# Patient Record
Sex: FEMALE | Race: WHITE | URBAN - METROPOLITAN AREA
[De-identification: names, ages, dates, MRNs, and addresses within clinical notes are randomized per-mention and may not be internally consistent; named-entity substitution may affect disease eponyms.]

---

## 2022-11-04 ENCOUNTER — HOSPITAL ENCOUNTER (OUTPATIENT)
Dept: RADIOLOGY | Facility: MEDICAL CENTER | Age: 87
End: 2022-11-04

## 2023-01-01 ENCOUNTER — APPOINTMENT (OUTPATIENT)
Dept: RADIOLOGY | Facility: MEDICAL CENTER | Age: 88
DRG: 082 | End: 2023-01-01
Attending: EMERGENCY MEDICINE
Payer: MEDICARE

## 2023-01-01 ENCOUNTER — APPOINTMENT (OUTPATIENT)
Dept: RADIOLOGY | Facility: MEDICAL CENTER | Age: 88
DRG: 082 | End: 2023-01-01
Payer: MEDICARE

## 2023-01-01 ENCOUNTER — HOSPITAL ENCOUNTER (OUTPATIENT)
Dept: RADIOLOGY | Facility: MEDICAL CENTER | Age: 88
End: 2023-11-28

## 2023-01-01 ENCOUNTER — HOSPITAL ENCOUNTER (INPATIENT)
Facility: MEDICAL CENTER | Age: 88
LOS: 3 days | DRG: 082 | End: 2023-12-01
Attending: EMERGENCY MEDICINE | Admitting: SURGERY
Payer: MEDICARE

## 2023-01-01 ENCOUNTER — APPOINTMENT (OUTPATIENT)
Dept: RADIOLOGY | Facility: MEDICAL CENTER | Age: 88
DRG: 082 | End: 2023-01-01
Attending: SURGERY
Payer: MEDICARE

## 2023-01-01 VITALS
WEIGHT: 103.62 LBS | OXYGEN SATURATION: 91 % | RESPIRATION RATE: 17 BRPM | SYSTOLIC BLOOD PRESSURE: 127 MMHG | HEART RATE: 89 BPM | DIASTOLIC BLOOD PRESSURE: 78 MMHG | HEIGHT: 58 IN | BODY MASS INDEX: 21.75 KG/M2 | TEMPERATURE: 97.9 F

## 2023-01-01 DIAGNOSIS — T14.90XA TRAUMA: ICD-10-CM

## 2023-01-01 DIAGNOSIS — F02.C11 SEVERE ALZHEIMER'S DEMENTIA WITH AGITATION, UNSPECIFIED TIMING OF DEMENTIA ONSET (HCC): ICD-10-CM

## 2023-01-01 DIAGNOSIS — G30.9 SEVERE ALZHEIMER'S DEMENTIA WITH AGITATION, UNSPECIFIED TIMING OF DEMENTIA ONSET (HCC): ICD-10-CM

## 2023-01-01 LAB
ABO + RH BLD: NORMAL
ABO GROUP BLD: NORMAL
ALBUMIN SERPL BCP-MCNC: 3.6 G/DL (ref 3.2–4.9)
ALBUMIN/GLOB SERPL: 1.2 G/DL
ALP SERPL-CCNC: 83 U/L (ref 30–99)
ALT SERPL-CCNC: 25 U/L (ref 2–50)
ANION GAP SERPL CALC-SCNC: 11 MMOL/L (ref 7–16)
ANION GAP SERPL CALC-SCNC: 11 MMOL/L (ref 7–16)
ANION GAP SERPL CALC-SCNC: 14 MMOL/L (ref 7–16)
ANION GAP SERPL CALC-SCNC: 17 MMOL/L (ref 7–16)
APPEARANCE UR: CLEAR
APTT PPP: 33.6 SEC (ref 24.7–36)
AST SERPL-CCNC: 44 U/L (ref 12–45)
BACTERIA #/AREA URNS HPF: NEGATIVE /HPF
BARCODED ABORH UBTYP: 5100
BARCODED PRD CODE UBPRD: NORMAL
BARCODED UNIT NUM UBUNT: NORMAL
BASOPHILS # BLD AUTO: 0 % (ref 0–1.8)
BASOPHILS # BLD AUTO: 1.6 % (ref 0–1.8)
BASOPHILS # BLD: 0 K/UL (ref 0–0.12)
BASOPHILS # BLD: 0.05 K/UL (ref 0–0.12)
BILIRUB SERPL-MCNC: 0.7 MG/DL (ref 0.1–1.5)
BILIRUB UR QL STRIP.AUTO: NEGATIVE
BLD GP AB SCN SERPL QL: NORMAL
BUN SERPL-MCNC: 42 MG/DL (ref 8–22)
BUN SERPL-MCNC: 49 MG/DL (ref 8–22)
BUN SERPL-MCNC: 49 MG/DL (ref 8–22)
BUN SERPL-MCNC: 51 MG/DL (ref 8–22)
BURR CELLS BLD QL SMEAR: NORMAL
C DIFF DNA SPEC QL NAA+PROBE: NEGATIVE
C DIFF TOX GENS STL QL NAA+PROBE: NEGATIVE
CALCIUM ALBUM COR SERPL-MCNC: 9.8 MG/DL (ref 8.5–10.5)
CALCIUM SERPL-MCNC: 7.6 MG/DL (ref 8.5–10.5)
CALCIUM SERPL-MCNC: 8.2 MG/DL (ref 8.5–10.5)
CALCIUM SERPL-MCNC: 8.6 MG/DL (ref 8.5–10.5)
CALCIUM SERPL-MCNC: 9.5 MG/DL (ref 8.5–10.5)
CFT BLD TEG: 4.4 MIN (ref 4.6–9.1)
CFT BLD TEG: 4.9 MIN (ref 4.6–9.1)
CFT P HPASE BLD TEG: 4.6 MIN (ref 4.3–8.3)
CFT P HPASE BLD TEG: 5 MIN (ref 4.3–8.3)
CHLORIDE SERPL-SCNC: 101 MMOL/L (ref 96–112)
CHLORIDE SERPL-SCNC: 106 MMOL/L (ref 96–112)
CHLORIDE SERPL-SCNC: 112 MMOL/L (ref 96–112)
CHLORIDE SERPL-SCNC: 99 MMOL/L (ref 96–112)
CK SERPL-CCNC: 753 U/L (ref 0–154)
CLOT ANGLE BLD TEG: 72.6 DEGREES (ref 63–78)
CLOT ANGLE BLD TEG: 75.7 DEGREES (ref 63–78)
CLOT LYSIS 30M P MA LENFR BLD TEG: 0 % (ref 0–2.6)
CLOT LYSIS 30M P MA LENFR BLD TEG: 0 % (ref 0–2.6)
CO2 SERPL-SCNC: 18 MMOL/L (ref 20–33)
CO2 SERPL-SCNC: 19 MMOL/L (ref 20–33)
COLOR UR: YELLOW
COMPONENT P 8504P: NORMAL
CREAT SERPL-MCNC: 1.25 MG/DL (ref 0.5–1.4)
CREAT SERPL-MCNC: 1.74 MG/DL (ref 0.5–1.4)
CREAT SERPL-MCNC: 1.82 MG/DL (ref 0.5–1.4)
CREAT SERPL-MCNC: 2.07 MG/DL (ref 0.5–1.4)
CRP SERPL HS-MCNC: 26.92 MG/DL (ref 0–0.75)
CT.EXTRINSIC BLD ROTEM: 1 MIN (ref 0.8–2.1)
CT.EXTRINSIC BLD ROTEM: 1.3 MIN (ref 0.8–2.1)
EKG IMPRESSION: NORMAL
EOSINOPHIL # BLD AUTO: 0 K/UL (ref 0–0.51)
EOSINOPHIL # BLD AUTO: 0 K/UL (ref 0–0.51)
EOSINOPHIL NFR BLD: 0 % (ref 0–6.9)
EOSINOPHIL NFR BLD: 0 % (ref 0–6.9)
EPI CELLS #/AREA URNS HPF: NEGATIVE /HPF
ERYTHROCYTE [DISTWIDTH] IN BLOOD BY AUTOMATED COUNT: 51.4 FL (ref 35.9–50)
ERYTHROCYTE [DISTWIDTH] IN BLOOD BY AUTOMATED COUNT: 52.8 FL (ref 35.9–50)
ERYTHROCYTE [DISTWIDTH] IN BLOOD BY AUTOMATED COUNT: 53.6 FL (ref 35.9–50)
ETHANOL BLD-MCNC: <10.1 MG/DL
GFR SERPLBLD CREATININE-BSD FMLA CKD-EPI: 22 ML/MIN/1.73 M 2
GFR SERPLBLD CREATININE-BSD FMLA CKD-EPI: 26 ML/MIN/1.73 M 2
GFR SERPLBLD CREATININE-BSD FMLA CKD-EPI: 27 ML/MIN/1.73 M 2
GFR SERPLBLD CREATININE-BSD FMLA CKD-EPI: 41 ML/MIN/1.73 M 2
GLOBULIN SER CALC-MCNC: 2.9 G/DL (ref 1.9–3.5)
GLUCOSE BLD STRIP.AUTO-MCNC: 102 MG/DL (ref 65–99)
GLUCOSE BLD STRIP.AUTO-MCNC: 66 MG/DL (ref 65–99)
GLUCOSE BLD STRIP.AUTO-MCNC: 68 MG/DL (ref 65–99)
GLUCOSE BLD STRIP.AUTO-MCNC: 74 MG/DL (ref 65–99)
GLUCOSE BLD STRIP.AUTO-MCNC: 83 MG/DL (ref 65–99)
GLUCOSE BLD STRIP.AUTO-MCNC: 89 MG/DL (ref 65–99)
GLUCOSE BLD STRIP.AUTO-MCNC: 92 MG/DL (ref 65–99)
GLUCOSE SERPL-MCNC: 107 MG/DL (ref 65–99)
GLUCOSE SERPL-MCNC: 72 MG/DL (ref 65–99)
GLUCOSE SERPL-MCNC: 79 MG/DL (ref 65–99)
GLUCOSE SERPL-MCNC: 98 MG/DL (ref 65–99)
GLUCOSE UR STRIP.AUTO-MCNC: NEGATIVE MG/DL
HCT VFR BLD AUTO: 24.4 % (ref 37–47)
HCT VFR BLD AUTO: 26.4 % (ref 37–47)
HCT VFR BLD AUTO: 31.2 % (ref 37–47)
HGB BLD-MCNC: 10 G/DL (ref 12–16)
HGB BLD-MCNC: 8.3 G/DL (ref 12–16)
HGB BLD-MCNC: 8.7 G/DL (ref 12–16)
HYALINE CASTS #/AREA URNS LPF: NORMAL /LPF
INR PPP: 1.25 (ref 0.87–1.13)
KETONES UR STRIP.AUTO-MCNC: NEGATIVE MG/DL
LEUKOCYTE ESTERASE UR QL STRIP.AUTO: NEGATIVE
LYMPHOCYTES # BLD AUTO: 0.88 K/UL (ref 1–4.8)
LYMPHOCYTES # BLD AUTO: 1.41 K/UL (ref 1–4.8)
LYMPHOCYTES NFR BLD: 28.5 % (ref 22–41)
LYMPHOCYTES NFR BLD: 9.8 % (ref 22–41)
MACROCYTES BLD QL SMEAR: ABNORMAL
MAGNESIUM SERPL-MCNC: 2.5 MG/DL (ref 1.5–2.5)
MANUAL DIFF BLD: ABNORMAL
MANUAL DIFF BLD: NORMAL
MCF BLD TEG: 60.3 MM (ref 52–69)
MCF BLD TEG: 65.6 MM (ref 52–69)
MCF.PLATELET INHIB BLD ROTEM: 17.4 MM (ref 15–32)
MCF.PLATELET INHIB BLD ROTEM: 21.4 MM (ref 15–32)
MCH RBC QN AUTO: 28.3 PG (ref 27–33)
MCH RBC QN AUTO: 29.1 PG (ref 27–33)
MCH RBC QN AUTO: 29.1 PG (ref 27–33)
MCHC RBC AUTO-ENTMCNC: 32.1 G/DL (ref 32.2–35.5)
MCHC RBC AUTO-ENTMCNC: 33 G/DL (ref 32.2–35.5)
MCHC RBC AUTO-ENTMCNC: 34 G/DL (ref 32.2–35.5)
MCV RBC AUTO: 85.6 FL (ref 81.4–97.8)
MCV RBC AUTO: 86 FL (ref 81.4–97.8)
MCV RBC AUTO: 90.7 FL (ref 81.4–97.8)
METAMYELOCYTES NFR BLD MANUAL: 1.7 %
MICRO URNS: ABNORMAL
MONOCYTES # BLD AUTO: 0.71 K/UL (ref 0–0.85)
MONOCYTES # BLD AUTO: 0.81 K/UL (ref 0–0.85)
MONOCYTES NFR BLD AUTO: 26 % (ref 0–13.4)
MONOCYTES NFR BLD AUTO: 4.9 % (ref 0–13.4)
MORPHOLOGY BLD-IMP: NORMAL
MORPHOLOGY BLD-IMP: NORMAL
NEUTROPHILS # BLD AUTO: 1.36 K/UL (ref 1.82–7.42)
NEUTROPHILS # BLD AUTO: 12.04 K/UL (ref 1.82–7.42)
NEUTROPHILS NFR BLD: 40.6 % (ref 44–72)
NEUTROPHILS NFR BLD: 72.1 % (ref 44–72)
NEUTS BAND NFR BLD MANUAL: 11.5 % (ref 0–10)
NEUTS BAND NFR BLD MANUAL: 3.3 % (ref 0–10)
NITRITE UR QL STRIP.AUTO: NEGATIVE
NRBC # BLD AUTO: 0 K/UL
NRBC # BLD AUTO: 0 K/UL
NRBC BLD-RTO: 0 /100 WBC (ref 0–0.2)
NRBC BLD-RTO: 0 /100 WBC (ref 0–0.2)
PA AA BLD-ACNC: 76.8 % (ref 0–11)
PA AA BLD-ACNC: 93.4 % (ref 0–11)
PA ADP BLD-ACNC: 90.5 % (ref 0–17)
PA ADP BLD-ACNC: 98.5 % (ref 0–17)
PH UR STRIP.AUTO: 5.5 [PH] (ref 5–8)
PLATELET # BLD AUTO: 233 K/UL (ref 164–446)
PLATELET # BLD AUTO: 237 K/UL (ref 164–446)
PLATELET # BLD AUTO: 263 K/UL (ref 164–446)
PLATELET BLD QL SMEAR: NORMAL
PLATELET BLD QL SMEAR: NORMAL
PMV BLD AUTO: 9.6 FL (ref 9–12.9)
PMV BLD AUTO: 9.7 FL (ref 9–12.9)
PMV BLD AUTO: 9.8 FL (ref 9–12.9)
POIKILOCYTOSIS BLD QL SMEAR: NORMAL
POTASSIUM SERPL-SCNC: 3.8 MMOL/L (ref 3.6–5.5)
POTASSIUM SERPL-SCNC: 4.4 MMOL/L (ref 3.6–5.5)
POTASSIUM SERPL-SCNC: 4.7 MMOL/L (ref 3.6–5.5)
POTASSIUM SERPL-SCNC: 4.8 MMOL/L (ref 3.6–5.5)
PROCALCITONIN SERPL-MCNC: 20.7 NG/ML
PRODUCT TYPE UPROD: NORMAL
PROT SERPL-MCNC: 6.5 G/DL (ref 6–8.2)
PROT UR QL STRIP: NEGATIVE MG/DL
PROTHROMBIN TIME: 15.8 SEC (ref 12–14.6)
RBC # BLD AUTO: 2.85 M/UL (ref 4.2–5.4)
RBC # BLD AUTO: 3.07 M/UL (ref 4.2–5.4)
RBC # BLD AUTO: 3.44 M/UL (ref 4.2–5.4)
RBC # URNS HPF: NORMAL /HPF
RBC BLD AUTO: NORMAL
RBC BLD AUTO: PRESENT
RBC UR QL AUTO: ABNORMAL
RH BLD: NORMAL
SODIUM SERPL-SCNC: 133 MMOL/L (ref 135–145)
SODIUM SERPL-SCNC: 134 MMOL/L (ref 135–145)
SODIUM SERPL-SCNC: 136 MMOL/L (ref 135–145)
SODIUM SERPL-SCNC: 141 MMOL/L (ref 135–145)
SP GR UR STRIP.AUTO: 1.02
TEG ALGORITHM TGALG: ABNORMAL
TEG ALGORITHM TGALG: ABNORMAL
TOXIC GRANULES BLD QL SMEAR: NORMAL
UNIT STATUS USTAT: NORMAL
UROBILINOGEN UR STRIP.AUTO-MCNC: 0.2 MG/DL
WBC # BLD AUTO: 14.4 K/UL (ref 4.8–10.8)
WBC # BLD AUTO: 18.7 K/UL (ref 4.8–10.8)
WBC # BLD AUTO: 3.1 K/UL (ref 4.8–10.8)
WBC #/AREA URNS HPF: NORMAL /HPF
WBC TOXIC VACUOLES BLD QL SMEAR: NORMAL

## 2023-01-01 PROCEDURE — 81001 URINALYSIS AUTO W/SCOPE: CPT

## 2023-01-01 PROCEDURE — A9270 NON-COVERED ITEM OR SERVICE: HCPCS | Performed by: SURGERY

## 2023-01-01 PROCEDURE — 700105 HCHG RX REV CODE 258: Performed by: INTERNAL MEDICINE

## 2023-01-01 PROCEDURE — 700111 HCHG RX REV CODE 636 W/ 250 OVERRIDE (IP): Mod: JZ | Performed by: SURGERY

## 2023-01-01 PROCEDURE — A9270 NON-COVERED ITEM OR SERVICE: HCPCS | Performed by: INTERNAL MEDICINE

## 2023-01-01 PROCEDURE — 80048 BASIC METABOLIC PNL TOTAL CA: CPT

## 2023-01-01 PROCEDURE — 73130 X-RAY EXAM OF HAND: CPT | Mod: LT

## 2023-01-01 PROCEDURE — 82962 GLUCOSE BLOOD TEST: CPT

## 2023-01-01 PROCEDURE — 99233 SBSQ HOSP IP/OBS HIGH 50: CPT | Mod: 25 | Performed by: INTERNAL MEDICINE

## 2023-01-01 PROCEDURE — 700101 HCHG RX REV CODE 250: Performed by: NURSE PRACTITIONER

## 2023-01-01 PROCEDURE — 700105 HCHG RX REV CODE 258

## 2023-01-01 PROCEDURE — 99223 1ST HOSP IP/OBS HIGH 75: CPT | Mod: 25 | Performed by: INTERNAL MEDICINE

## 2023-01-01 PROCEDURE — 700101 HCHG RX REV CODE 250: Performed by: SURGERY

## 2023-01-01 PROCEDURE — 87493 C DIFF AMPLIFIED PROBE: CPT

## 2023-01-01 PROCEDURE — 93010 ELECTROCARDIOGRAM REPORT: CPT | Performed by: INTERNAL MEDICINE

## 2023-01-01 PROCEDURE — 85347 COAGULATION TIME ACTIVATED: CPT

## 2023-01-01 PROCEDURE — 700111 HCHG RX REV CODE 636 W/ 250 OVERRIDE (IP): Performed by: SURGERY

## 2023-01-01 PROCEDURE — 770022 HCHG ROOM/CARE - ICU (200)

## 2023-01-01 PROCEDURE — 97163 PT EVAL HIGH COMPLEX 45 MIN: CPT

## 2023-01-01 PROCEDURE — 85730 THROMBOPLASTIN TIME PARTIAL: CPT

## 2023-01-01 PROCEDURE — 86901 BLOOD TYPING SEROLOGIC RH(D): CPT

## 2023-01-01 PROCEDURE — 700105 HCHG RX REV CODE 258: Performed by: NURSE PRACTITIONER

## 2023-01-01 PROCEDURE — 700117 HCHG RX CONTRAST REV CODE 255

## 2023-01-01 PROCEDURE — 85007 BL SMEAR W/DIFF WBC COUNT: CPT

## 2023-01-01 PROCEDURE — 99232 SBSQ HOSP IP/OBS MODERATE 35: CPT | Performed by: NURSE PRACTITIONER

## 2023-01-01 PROCEDURE — 36430 TRANSFUSION BLD/BLD COMPNT: CPT

## 2023-01-01 PROCEDURE — 85384 FIBRINOGEN ACTIVITY: CPT

## 2023-01-01 PROCEDURE — 93005 ELECTROCARDIOGRAM TRACING: CPT | Performed by: SURGERY

## 2023-01-01 PROCEDURE — 80053 COMPREHEN METABOLIC PANEL: CPT

## 2023-01-01 PROCEDURE — 85576 BLOOD PLATELET AGGREGATION: CPT | Mod: 91

## 2023-01-01 PROCEDURE — 700111 HCHG RX REV CODE 636 W/ 250 OVERRIDE (IP): Performed by: INTERNAL MEDICINE

## 2023-01-01 PROCEDURE — 700102 HCHG RX REV CODE 250 W/ 637 OVERRIDE(OP): Performed by: INTERNAL MEDICINE

## 2023-01-01 PROCEDURE — A9270 NON-COVERED ITEM OR SERVICE: HCPCS | Performed by: NURSE PRACTITIONER

## 2023-01-01 PROCEDURE — 306565 RIGID MIT RESTRAINT(PAIR): Performed by: SURGERY

## 2023-01-01 PROCEDURE — 8E0ZXY6 ISOLATION: ICD-10-PCS | Performed by: INTERNAL MEDICINE

## 2023-01-01 PROCEDURE — 93005 ELECTROCARDIOGRAM TRACING: CPT | Performed by: NURSE PRACTITIONER

## 2023-01-01 PROCEDURE — 99291 CRITICAL CARE FIRST HOUR: CPT

## 2023-01-01 PROCEDURE — 700102 HCHG RX REV CODE 250 W/ 637 OVERRIDE(OP): Performed by: SURGERY

## 2023-01-01 PROCEDURE — 97535 SELF CARE MNGMENT TRAINING: CPT

## 2023-01-01 PROCEDURE — 82077 ASSAY SPEC XCP UR&BREATH IA: CPT

## 2023-01-01 PROCEDURE — 82550 ASSAY OF CK (CPK): CPT

## 2023-01-01 PROCEDURE — 99497 ADVNCD CARE PLAN 30 MIN: CPT | Performed by: INTERNAL MEDICINE

## 2023-01-01 PROCEDURE — 99232 SBSQ HOSP IP/OBS MODERATE 35: CPT | Performed by: INTERNAL MEDICINE

## 2023-01-01 PROCEDURE — 700102 HCHG RX REV CODE 250 W/ 637 OVERRIDE(OP): Performed by: NURSE PRACTITIONER

## 2023-01-01 PROCEDURE — 30233R1 TRANSFUSION OF NONAUTOLOGOUS PLATELETS INTO PERIPHERAL VEIN, PERCUTANEOUS APPROACH: ICD-10-PCS | Performed by: EMERGENCY MEDICINE

## 2023-01-01 PROCEDURE — 99223 1ST HOSP IP/OBS HIGH 75: CPT | Mod: AI | Performed by: SURGERY

## 2023-01-01 PROCEDURE — 87040 BLOOD CULTURE FOR BACTERIA: CPT | Mod: 91

## 2023-01-01 PROCEDURE — 93010 ELECTROCARDIOGRAM REPORT: CPT | Mod: 76 | Performed by: INTERNAL MEDICINE

## 2023-01-01 PROCEDURE — P9034 PLATELETS, PHERESIS: HCPCS

## 2023-01-01 PROCEDURE — 72170 X-RAY EXAM OF PELVIS: CPT

## 2023-01-01 PROCEDURE — 85027 COMPLETE CBC AUTOMATED: CPT

## 2023-01-01 PROCEDURE — 86140 C-REACTIVE PROTEIN: CPT

## 2023-01-01 PROCEDURE — 36415 COLL VENOUS BLD VENIPUNCTURE: CPT

## 2023-01-01 PROCEDURE — 770001 HCHG ROOM/CARE - MED/SURG/GYN PRIV*

## 2023-01-01 PROCEDURE — 84145 PROCALCITONIN (PCT): CPT

## 2023-01-01 PROCEDURE — 83735 ASSAY OF MAGNESIUM: CPT

## 2023-01-01 PROCEDURE — 700111 HCHG RX REV CODE 636 W/ 250 OVERRIDE (IP): Mod: JZ | Performed by: INTERNAL MEDICINE

## 2023-01-01 PROCEDURE — 86850 RBC ANTIBODY SCREEN: CPT

## 2023-01-01 PROCEDURE — 71045 X-RAY EXAM CHEST 1 VIEW: CPT

## 2023-01-01 PROCEDURE — 86900 BLOOD TYPING SEROLOGIC ABO: CPT

## 2023-01-01 PROCEDURE — 85610 PROTHROMBIN TIME: CPT

## 2023-01-01 PROCEDURE — 70450 CT HEAD/BRAIN W/O DYE: CPT

## 2023-01-01 PROCEDURE — 74177 CT ABD & PELVIS W/CONTRAST: CPT

## 2023-01-01 PROCEDURE — 97167 OT EVAL HIGH COMPLEX 60 MIN: CPT

## 2023-01-01 PROCEDURE — 99231 SBSQ HOSP IP/OBS SF/LOW 25: CPT | Performed by: NURSE PRACTITIONER

## 2023-01-01 PROCEDURE — 82962 GLUCOSE BLOOD TEST: CPT | Mod: 91

## 2023-01-01 PROCEDURE — 99232 SBSQ HOSP IP/OBS MODERATE 35: CPT

## 2023-01-01 PROCEDURE — G0390 TRAUMA RESPONS W/HOSP CRITI: HCPCS

## 2023-01-01 PROCEDURE — 92610 EVALUATE SWALLOWING FUNCTION: CPT

## 2023-01-01 RX ORDER — AMOXICILLIN 250 MG
1 CAPSULE ORAL NIGHTLY
Status: DISCONTINUED | OUTPATIENT
Start: 2023-01-01 | End: 2023-01-01

## 2023-01-01 RX ORDER — TRAMADOL HYDROCHLORIDE 50 MG/1
50 TABLET ORAL EVERY 6 HOURS PRN
COMMUNITY
Start: 2023-01-01

## 2023-01-01 RX ORDER — BUPROPION HYDROCHLORIDE 150 MG/1
150 TABLET, EXTENDED RELEASE ORAL EVERY MORNING
Status: DISCONTINUED | OUTPATIENT
Start: 2023-01-01 | End: 2023-01-01

## 2023-01-01 RX ORDER — METOPROLOL SUCCINATE 50 MG/1
50 TABLET, EXTENDED RELEASE ORAL DAILY
COMMUNITY
Start: 2023-01-01

## 2023-01-01 RX ORDER — SODIUM CHLORIDE 9 MG/ML
INJECTION, SOLUTION INTRAVENOUS CONTINUOUS
Status: DISCONTINUED | OUTPATIENT
Start: 2023-01-01 | End: 2023-01-01

## 2023-01-01 RX ORDER — HALOPERIDOL 5 MG/ML
1 INJECTION INTRAMUSCULAR EVERY 6 HOURS PRN
Status: DISCONTINUED | OUTPATIENT
Start: 2023-01-01 | End: 2023-01-01 | Stop reason: HOSPADM

## 2023-01-01 RX ORDER — ATROPINE SULFATE 10 MG/ML
2 SOLUTION/ DROPS OPHTHALMIC EVERY 4 HOURS PRN
Status: DISCONTINUED | OUTPATIENT
Start: 2023-01-01 | End: 2023-01-01 | Stop reason: HOSPADM

## 2023-01-01 RX ORDER — ATORVASTATIN CALCIUM 40 MG/1
40 TABLET, FILM COATED ORAL DAILY
Status: DISCONTINUED | OUTPATIENT
Start: 2023-01-01 | End: 2023-01-01

## 2023-01-01 RX ORDER — DOCUSATE SODIUM 100 MG/1
100 CAPSULE, LIQUID FILLED ORAL 2 TIMES DAILY
Status: DISCONTINUED | OUTPATIENT
Start: 2023-01-01 | End: 2023-01-01

## 2023-01-01 RX ORDER — MORPHINE SULFATE 4 MG/ML
2 INJECTION INTRAVENOUS ONCE
Status: CANCELLED | OUTPATIENT
Start: 2023-01-01 | End: 2023-01-01

## 2023-01-01 RX ORDER — MORPHINE SULFATE 4 MG/ML
2 INJECTION INTRAVENOUS
Status: DISCONTINUED | OUTPATIENT
Start: 2023-01-01 | End: 2023-01-01

## 2023-01-01 RX ORDER — QUETIAPINE FUMARATE 25 MG/1
12.5 TABLET, FILM COATED ORAL NIGHTLY PRN
Status: DISCONTINUED | OUTPATIENT
Start: 2023-01-01 | End: 2023-01-01

## 2023-01-01 RX ORDER — METOPROLOL TARTRATE 1 MG/ML
2.5 INJECTION, SOLUTION INTRAVENOUS ONCE
Status: COMPLETED | OUTPATIENT
Start: 2023-01-01 | End: 2023-01-01

## 2023-01-01 RX ORDER — QUETIAPINE FUMARATE 25 MG/1
12.5 TABLET, FILM COATED ORAL NIGHTLY
Status: DISCONTINUED | OUTPATIENT
Start: 2023-01-01 | End: 2023-01-01 | Stop reason: HOSPADM

## 2023-01-01 RX ORDER — OXYCODONE HYDROCHLORIDE 5 MG/1
2.5 TABLET ORAL
Status: DISCONTINUED | OUTPATIENT
Start: 2023-01-01 | End: 2023-01-01

## 2023-01-01 RX ORDER — HYDROMORPHONE HYDROCHLORIDE 1 MG/ML
0.25 INJECTION, SOLUTION INTRAMUSCULAR; INTRAVENOUS; SUBCUTANEOUS
Status: DISCONTINUED | OUTPATIENT
Start: 2023-01-01 | End: 2023-01-01

## 2023-01-01 RX ORDER — SODIUM CHLORIDE 9 MG/ML
500 INJECTION, SOLUTION INTRAVENOUS ONCE
Status: COMPLETED | OUTPATIENT
Start: 2023-01-01 | End: 2023-01-01

## 2023-01-01 RX ORDER — ACETAMINOPHEN 500 MG
1000 TABLET ORAL EVERY 6 HOURS
Status: DISCONTINUED | OUTPATIENT
Start: 2023-01-01 | End: 2023-01-01

## 2023-01-01 RX ORDER — ONDANSETRON 2 MG/ML
4 INJECTION INTRAMUSCULAR; INTRAVENOUS EVERY 4 HOURS PRN
Status: DISCONTINUED | OUTPATIENT
Start: 2023-01-01 | End: 2023-01-01 | Stop reason: HOSPADM

## 2023-01-01 RX ORDER — LEVOTHYROXINE SODIUM 0.05 MG/1
50 TABLET ORAL
Status: DISCONTINUED | OUTPATIENT
Start: 2023-01-01 | End: 2023-01-01

## 2023-01-01 RX ORDER — ATORVASTATIN CALCIUM 40 MG/1
40 TABLET, FILM COATED ORAL DAILY
COMMUNITY
Start: 2023-01-01

## 2023-01-01 RX ORDER — SCOLOPAMINE TRANSDERMAL SYSTEM 1 MG/1
1 PATCH, EXTENDED RELEASE TRANSDERMAL
Status: DISCONTINUED | OUTPATIENT
Start: 2023-01-01 | End: 2023-01-01 | Stop reason: HOSPADM

## 2023-01-01 RX ORDER — SODIUM CHLORIDE 9 MG/ML
250 INJECTION, SOLUTION INTRAVENOUS ONCE
Status: COMPLETED | OUTPATIENT
Start: 2023-01-01 | End: 2023-01-01

## 2023-01-01 RX ORDER — METRONIDAZOLE 500 MG/100ML
500 INJECTION, SOLUTION INTRAVENOUS EVERY 12 HOURS
Status: DISCONTINUED | OUTPATIENT
Start: 2023-01-01 | End: 2023-01-01

## 2023-01-01 RX ORDER — POLYETHYLENE GLYCOL 3350 17 G/17G
1 POWDER, FOR SOLUTION ORAL 2 TIMES DAILY
Status: DISCONTINUED | OUTPATIENT
Start: 2023-01-01 | End: 2023-01-01

## 2023-01-01 RX ORDER — BUPROPION HYDROCHLORIDE 150 MG/1
150 TABLET ORAL EVERY MORNING
COMMUNITY
Start: 2023-01-01

## 2023-01-01 RX ORDER — AMOXICILLIN 250 MG
1 CAPSULE ORAL
Status: DISCONTINUED | OUTPATIENT
Start: 2023-01-01 | End: 2023-01-01

## 2023-01-01 RX ORDER — OXYCODONE HYDROCHLORIDE 5 MG/1
5 TABLET ORAL
Status: DISCONTINUED | OUTPATIENT
Start: 2023-01-01 | End: 2023-01-01

## 2023-01-01 RX ORDER — CLOPIDOGREL BISULFATE 75 MG/1
75 TABLET ORAL DAILY
COMMUNITY
Start: 2023-01-01

## 2023-01-01 RX ORDER — LORAZEPAM 2 MG/ML
0.5 INJECTION INTRAMUSCULAR
Status: DISCONTINUED | OUTPATIENT
Start: 2023-01-01 | End: 2023-01-01 | Stop reason: HOSPADM

## 2023-01-01 RX ORDER — ENEMA 19; 7 G/133ML; G/133ML
1 ENEMA RECTAL
Status: DISCONTINUED | OUTPATIENT
Start: 2023-01-01 | End: 2023-01-01

## 2023-01-01 RX ORDER — MORPHINE SULFATE 100 MG/5ML
3 SOLUTION ORAL EVERY 4 HOURS PRN
Status: DISCONTINUED | OUTPATIENT
Start: 2023-01-01 | End: 2023-01-01 | Stop reason: HOSPADM

## 2023-01-01 RX ORDER — HALOPERIDOL 2 MG/ML
1 SOLUTION ORAL EVERY 6 HOURS PRN
Status: DISCONTINUED | OUTPATIENT
Start: 2023-01-01 | End: 2023-01-01 | Stop reason: HOSPADM

## 2023-01-01 RX ORDER — ACETAMINOPHEN 500 MG
1000 TABLET ORAL EVERY 6 HOURS PRN
Status: DISCONTINUED | OUTPATIENT
Start: 2023-12-03 | End: 2023-01-01

## 2023-01-01 RX ORDER — LEVETIRACETAM 500 MG/5ML
500 INJECTION, SOLUTION, CONCENTRATE INTRAVENOUS EVERY 12 HOURS
Status: DISCONTINUED | OUTPATIENT
Start: 2023-01-01 | End: 2023-01-01

## 2023-01-01 RX ORDER — BISACODYL 10 MG
10 SUPPOSITORY, RECTAL RECTAL
Status: DISCONTINUED | OUTPATIENT
Start: 2023-01-01 | End: 2023-01-01

## 2023-01-01 RX ORDER — LEVETIRACETAM 500 MG/1
500 TABLET ORAL EVERY 12 HOURS
Status: DISCONTINUED | OUTPATIENT
Start: 2023-01-01 | End: 2023-01-01

## 2023-01-01 RX ORDER — METOPROLOL SUCCINATE 25 MG/1
50 TABLET, EXTENDED RELEASE ORAL DAILY
Status: DISCONTINUED | OUTPATIENT
Start: 2023-01-01 | End: 2023-01-01

## 2023-01-01 RX ORDER — BACITRACIN ZINC AND POLYMYXIN B SULFATE 500; 1000 [USP'U]/G; [USP'U]/G
OINTMENT TOPICAL 3 TIMES DAILY
Status: DISCONTINUED | OUTPATIENT
Start: 2023-01-01 | End: 2023-01-01

## 2023-01-01 RX ORDER — HALOPERIDOL 5 MG/ML
5 INJECTION INTRAMUSCULAR EVERY 4 HOURS PRN
Status: DISCONTINUED | OUTPATIENT
Start: 2023-01-01 | End: 2023-01-01

## 2023-01-01 RX ORDER — METOPROLOL SUCCINATE 50 MG/1
50 TABLET, EXTENDED RELEASE ORAL DAILY
Status: DISCONTINUED | OUTPATIENT
Start: 2023-01-01 | End: 2023-01-01

## 2023-01-01 RX ORDER — ONDANSETRON 4 MG/1
4 TABLET, ORALLY DISINTEGRATING ORAL EVERY 4 HOURS PRN
Status: DISCONTINUED | OUTPATIENT
Start: 2023-01-01 | End: 2023-01-01 | Stop reason: HOSPADM

## 2023-01-01 RX ORDER — FAMOTIDINE 20 MG/1
20 TABLET, FILM COATED ORAL 2 TIMES DAILY
Status: DISCONTINUED | OUTPATIENT
Start: 2023-01-01 | End: 2023-01-01

## 2023-01-01 RX ORDER — LEVOTHYROXINE SODIUM 0.05 MG/1
50 TABLET ORAL
COMMUNITY
Start: 2023-01-01

## 2023-01-01 RX ADMIN — LEVETIRACETAM 500 MG: 100 INJECTION, SOLUTION, CONCENTRATE INTRAVENOUS at 17:22

## 2023-01-01 RX ADMIN — SODIUM CHLORIDE: 9 INJECTION, SOLUTION INTRAVENOUS at 14:14

## 2023-01-01 RX ADMIN — FAMOTIDINE 20 MG: 20 TABLET ORAL at 17:30

## 2023-01-01 RX ADMIN — LORAZEPAM 0.5 MG: 2 INJECTION INTRAMUSCULAR; INTRAVENOUS at 13:27

## 2023-01-01 RX ADMIN — ACETAMINOPHEN 1000 MG: 500 TABLET, FILM COATED ORAL at 11:49

## 2023-01-01 RX ADMIN — MORPHINE SULFATE 5 MG: 10 INJECTION INTRAVENOUS at 16:17

## 2023-01-01 RX ADMIN — LEVETIRACETAM 500 MG: 100 INJECTION, SOLUTION, CONCENTRATE INTRAVENOUS at 18:00

## 2023-01-01 RX ADMIN — MORPHINE SULFATE 2 MG: 4 INJECTION, SOLUTION INTRAMUSCULAR; INTRAVENOUS at 14:10

## 2023-01-01 RX ADMIN — MINERAL OIL, PETROLATUM 1 APPLICATION: 425; 568 OINTMENT OPHTHALMIC at 08:46

## 2023-01-01 RX ADMIN — METOPROLOL TARTRATE 1.25 MG: 5 INJECTION INTRAVENOUS at 02:30

## 2023-01-01 RX ADMIN — LORAZEPAM 0.5 MG: 2 INJECTION INTRAMUSCULAR; INTRAVENOUS at 10:30

## 2023-01-01 RX ADMIN — ATORVASTATIN CALCIUM 40 MG: 40 TABLET, FILM COATED ORAL at 15:56

## 2023-01-01 RX ADMIN — FAMOTIDINE 20 MG: 10 INJECTION, SOLUTION INTRAVENOUS at 06:03

## 2023-01-01 RX ADMIN — Medication 1 EACH: at 17:30

## 2023-01-01 RX ADMIN — ATORVASTATIN CALCIUM 40 MG: 40 TABLET, FILM COATED ORAL at 06:04

## 2023-01-01 RX ADMIN — Medication 1 EACH: at 06:04

## 2023-01-01 RX ADMIN — METRONIDAZOLE 500 MG: 5 INJECTION, SOLUTION INTRAVENOUS at 19:08

## 2023-01-01 RX ADMIN — Medication 1 EACH: at 08:18

## 2023-01-01 RX ADMIN — MORPHINE SULFATE 3 MG: 100 SOLUTION ORAL at 12:26

## 2023-01-01 RX ADMIN — Medication 1 EACH: at 17:23

## 2023-01-01 RX ADMIN — MORPHINE SULFATE 5 MG: 10 INJECTION INTRAVENOUS at 10:30

## 2023-01-01 RX ADMIN — MORPHINE SULFATE 5 MG: 10 INJECTION INTRAVENOUS at 23:03

## 2023-01-01 RX ADMIN — SODIUM CHLORIDE: 9 INJECTION, SOLUTION INTRAVENOUS at 04:25

## 2023-01-01 RX ADMIN — OXYCODONE 2.5 MG: 5 TABLET ORAL at 08:18

## 2023-01-01 RX ADMIN — QUETIAPINE FUMARATE 12.5 MG: 25 TABLET ORAL at 20:12

## 2023-01-01 RX ADMIN — OXYCODONE 2.5 MG: 5 TABLET ORAL at 22:03

## 2023-01-01 RX ADMIN — ACETAMINOPHEN 1000 MG: 500 TABLET, FILM COATED ORAL at 17:30

## 2023-01-01 RX ADMIN — SODIUM CHLORIDE 250 ML: 9 INJECTION, SOLUTION INTRAVENOUS at 17:15

## 2023-01-01 RX ADMIN — MORPHINE SULFATE 5 MG: 10 INJECTION INTRAVENOUS at 08:43

## 2023-01-01 RX ADMIN — MORPHINE SULFATE 5 MG: 10 INJECTION INTRAVENOUS at 03:28

## 2023-01-01 RX ADMIN — ACETAMINOPHEN 1000 MG: 500 TABLET, FILM COATED ORAL at 06:04

## 2023-01-01 RX ADMIN — SODIUM CHLORIDE: 9 INJECTION, SOLUTION INTRAVENOUS at 22:29

## 2023-01-01 RX ADMIN — METRONIDAZOLE 500 MG: 5 INJECTION, SOLUTION INTRAVENOUS at 05:55

## 2023-01-01 RX ADMIN — SODIUM CHLORIDE 250 ML: 9 INJECTION, SOLUTION INTRAVENOUS at 02:13

## 2023-01-01 RX ADMIN — MORPHINE SULFATE 5 MG: 10 INJECTION INTRAVENOUS at 15:18

## 2023-01-01 RX ADMIN — BUPROPION HYDROCHLORIDE 150 MG: 150 TABLET, EXTENDED RELEASE ORAL at 15:56

## 2023-01-01 RX ADMIN — Medication 1 EACH: at 11:50

## 2023-01-01 RX ADMIN — IOHEXOL 100 ML: 350 INJECTION, SOLUTION INTRAVENOUS at 15:45

## 2023-01-01 RX ADMIN — SODIUM CHLORIDE 1000 ML: 9 INJECTION, SOLUTION INTRAVENOUS at 00:40

## 2023-01-01 RX ADMIN — BUPROPION HYDROCHLORIDE 150 MG: 150 TABLET, EXTENDED RELEASE ORAL at 06:06

## 2023-01-01 RX ADMIN — LORAZEPAM 0.5 MG: 2 INJECTION INTRAMUSCULAR; INTRAVENOUS at 08:55

## 2023-01-01 RX ADMIN — SODIUM CHLORIDE 500 ML: 9 INJECTION, SOLUTION INTRAVENOUS at 02:38

## 2023-01-01 RX ADMIN — FAMOTIDINE 20 MG: 10 INJECTION, SOLUTION INTRAVENOUS at 17:23

## 2023-01-01 RX ADMIN — LORAZEPAM 0.5 MG: 2 INJECTION INTRAMUSCULAR; INTRAVENOUS at 15:18

## 2023-01-01 RX ADMIN — SODIUM CHLORIDE: 9 INJECTION, SOLUTION INTRAVENOUS at 13:39

## 2023-01-01 RX ADMIN — LEVOTHYROXINE SODIUM 50 MCG: 0.05 TABLET ORAL at 15:56

## 2023-01-01 RX ADMIN — MORPHINE SULFATE 5 MG: 10 INJECTION INTRAVENOUS at 13:27

## 2023-01-01 RX ADMIN — METOPROLOL SUCCINATE 50 MG: 25 TABLET, EXTENDED RELEASE ORAL at 08:18

## 2023-01-01 RX ADMIN — LEVOTHYROXINE SODIUM 50 MCG: 0.05 TABLET ORAL at 06:04

## 2023-01-01 RX ADMIN — LEVETIRACETAM 500 MG: 100 INJECTION, SOLUTION, CONCENTRATE INTRAVENOUS at 05:23

## 2023-01-01 RX ADMIN — QUETIAPINE FUMARATE 12.5 MG: 25 TABLET ORAL at 22:03

## 2023-01-01 RX ADMIN — LEVETIRACETAM 500 MG: 100 INJECTION, SOLUTION, CONCENTRATE INTRAVENOUS at 06:00

## 2023-01-01 RX ADMIN — IOHEXOL 25 ML: 350 INJECTION, SOLUTION INTRAVENOUS at 16:00

## 2023-01-01 RX ADMIN — SCOPOLAMINE 1 PATCH: 1.5 PATCH, EXTENDED RELEASE TRANSDERMAL at 12:26

## 2023-01-01 RX ADMIN — ACETAMINOPHEN 1000 MG: 500 TABLET, FILM COATED ORAL at 00:05

## 2023-01-01 ASSESSMENT — COGNITIVE AND FUNCTIONAL STATUS - GENERAL
DRESSING REGULAR UPPER BODY CLOTHING: A LITTLE
STANDING UP FROM CHAIR USING ARMS: A LITTLE
HELP NEEDED FOR BATHING: A LOT
TOILETING: A LOT
DRESSING REGULAR LOWER BODY CLOTHING: A LOT
MOBILITY SCORE: 15
MOVING FROM LYING ON BACK TO SITTING ON SIDE OF FLAT BED: UNABLE
DAILY ACTIVITIY SCORE: 15
WALKING IN HOSPITAL ROOM: A LITTLE
PERSONAL GROOMING: A LITTLE
EATING MEALS: A LITTLE
TURNING FROM BACK TO SIDE WHILE IN FLAT BAD: A LITTLE
MOVING TO AND FROM BED TO CHAIR: A LITTLE
CLIMB 3 TO 5 STEPS WITH RAILING: A LOT
SUGGESTED CMS G CODE MODIFIER MOBILITY: CK
SUGGESTED CMS G CODE MODIFIER DAILY ACTIVITY: CK

## 2023-01-01 ASSESSMENT — PAIN DESCRIPTION - PAIN TYPE
TYPE: ACUTE PAIN

## 2023-01-01 ASSESSMENT — GAIT ASSESSMENTS
DEVIATION: BRADYKINETIC;SHUFFLED GAIT
GAIT LEVEL OF ASSIST: MINIMAL ASSIST
DISTANCE (FEET): 3
ASSISTIVE DEVICE: HAND HELD ASSIST

## 2023-01-01 ASSESSMENT — COPD QUESTIONNAIRES
DO YOU EVER COUGH UP ANY MUCUS OR PHLEGM?: NO/ONLY WITH OCCASIONAL COLDS OR INFECTIONS
HAVE YOU SMOKED AT LEAST 100 CIGARETTES IN YOUR ENTIRE LIFE: NO/DON'T KNOW
COPD SCREENING SCORE: 2
DURING THE PAST 4 WEEKS HOW MUCH DID YOU FEEL SHORT OF BREATH: NONE/LITTLE OF THE TIME

## 2023-01-01 ASSESSMENT — ACTIVITIES OF DAILY LIVING (ADL): TOILETING: INDEPENDENT

## 2023-11-28 PROBLEM — S22.050A COMPRESSION FRACTURE OF T5 VERTEBRA (HCC): Status: ACTIVE | Noted: 2023-01-01

## 2023-11-28 PROBLEM — N82.3 RECTO-VAGINAL FISTULA: Status: ACTIVE | Noted: 2023-01-01

## 2023-11-28 PROBLEM — F32.A DEPRESSION: Status: ACTIVE | Noted: 2023-01-01

## 2023-11-28 PROBLEM — T14.90XA TRAUMA: Status: ACTIVE | Noted: 2023-01-01

## 2023-11-28 PROBLEM — E78.5 DYSLIPIDEMIA: Status: ACTIVE | Noted: 2023-01-01

## 2023-11-28 PROBLEM — K44.9 HIATAL HERNIA: Status: ACTIVE | Noted: 2023-01-01

## 2023-11-28 PROBLEM — N28.9 ABNORMAL RENAL FUNCTION: Status: ACTIVE | Noted: 2023-01-01

## 2023-11-28 PROBLEM — F03.90 DEMENTIA (HCC): Status: ACTIVE | Noted: 2023-01-01

## 2023-11-28 PROBLEM — Z53.09 CONTRAINDICATION TO DEEP VEIN THROMBOSIS (DVT) PROPHYLAXIS: Status: ACTIVE | Noted: 2023-01-01

## 2023-11-28 PROBLEM — Z71.89 ADVANCE CARE PLANNING: Status: ACTIVE | Noted: 2023-01-01

## 2023-11-28 PROBLEM — I49.9 ARRHYTHMIA: Status: ACTIVE | Noted: 2023-01-01

## 2023-11-28 PROBLEM — I47.19 OTHER SUPRAVENTRICULAR TACHYCARDIA (HCC): Status: ACTIVE | Noted: 2023-01-01

## 2023-11-28 PROBLEM — Z79.02 ENCOUNTER FOR CURRENT LONG TERM USE OF ANTIPLATELET DRUG: Status: ACTIVE | Noted: 2023-01-01

## 2023-11-28 PROBLEM — E03.9 HYPOTHYROID: Status: ACTIVE | Noted: 2023-01-01

## 2023-11-28 PROBLEM — I60.9: Status: ACTIVE | Noted: 2023-01-01

## 2023-11-28 PROBLEM — T07.XXXA MULTIPLE CONTUSIONS: Status: ACTIVE | Noted: 2023-01-01

## 2023-11-28 NOTE — H&P
TRAUMA HISTORY AND PHYSICAL    CHIEF COMPLAINT: Fall with head injury    HISTORY OF PRESENT ILLNESS: The patient is a 89  year old  female who was found down at home by family.  It is unknown when she fell.  The patient found with diffuse bruises and covered in stool.  She was evaluated at [facility] and transferred here for subarachnoid hemorrhage.  GCS has remained in the 14 range.  Triaged as a partial activation.  Thromboelastogram she showed profound platelet inhibition.  The patient noted by Dr. Andre, emergency department in the trauma service.  She was seen by myself approximately 30 minutes after arrival.  Admitted to the trauma ICU.  GCS is remained 30.  Naqvi placement called in part due to a significant amount of stool in the vaginal vault.  The admission creatinine is in the 2 range.  The patient received 1 L of fluid prior to arrival and is currently on maintenance fluid.  Urine output has been brisk.  Scan head shows subarachnoid hemorrhage and parenchymal hemorrhage.  Platelets have been transfused.  Repeat thromboelastogram is pending.  Repeat CT scan is pending.        PAST MEDICAL HISTORY:       PAST SURGICAL HISTORY: patient denies any surgical history     ALLERGIES: Not on File     CURRENT MEDICATIONS:   Home Medications       Reviewed by Gerard Medley (Pharmacy Tech) on 11/28/23 at 1255  Med List Status: Complete     Medication Last Dose Status   atorvastatin (LIPITOR) 40 MG Tab unknown Active   buPROPion (WELLBUTRIN XL) 150 MG XL tablet unknown Active   clopidogrel (PLAVIX) 75 MG Tab unknown Active   levothyroxine (SYNTHROID) 50 MCG Tab unknown Active   metoprolol SR (TOPROL XL) 50 MG TABLET SR 24 HR unknown Active   traMADol (ULTRAM) 50 MG Tab unknown Active                    FAMILY HISTORY: No family history on file.     SOCIAL HISTORY:   Social History     Tobacco Use    Smoking status: Not on file    Smokeless tobacco: Not on file   Substance and Sexual Activity    Alcohol use:  "Not on file    Drug use: Not on file    Sexual activity: Not on file       REVIEW OF SYSTEMS: Comprehensive review of systems is negative with the   exception of the aforementioned HPI, PMH, and PSH elements in accordance with CMS guidelines.     PHYSICAL EXAMINATION:     GENERAL: No distress  VITAL SIGNS: /63   Pulse 62   Temp 35.9 °C (96.6 °F)   Resp (!) 30   Ht 1.47 m (4' 9.87\")   Wt 47 kg (103 lb 9.9 oz)   SpO2 96%   HEAD AND NECK: Pupils:  Equal and Reactive  Dentition: Marginal repair  Facial:  Symmetrical.    NECK: No JVD. Trachea midline. Cervical tenderness is  absent   CHEST: Breath sounds are equal. No sternal tenderness.  No  lateral rib tenderness.  CARDIOVASCULAR: Regular rhythm  ABDOMEN: Soft, no tenderness guarding or peritoneal findings.   PELVIS: Stable.  EXTREMITIES: Examination of the upper and lower extremities : No gross long bone or joint deformity.   NEUROLOGIC: Murali Coma Score is 14  .  WANG    LABORATORY VALUES:   Recent Labs     11/28/23  1151   WBC 18.7*   RBC 3.44*   HEMOGLOBIN 10.0*   HEMATOCRIT 31.2*   MCV 90.7   MCH 29.1   MCHC 32.1*   RDW 53.6*   PLATELETCT 233   MPV 9.6     Recent Labs     11/28/23  1151   SODIUM 134*   POTASSIUM 4.8   CHLORIDE 99   CO2 18*   GLUCOSE 107*   BUN 49*   CREATININE 2.07*   CALCIUM 9.5     Recent Labs     11/28/23  1151   ASTSGOT 44   ALTSGPT 25   TBILIRUBIN 0.7   ALKPHOSPHAT 83   GLOBULIN 2.9   INR 1.25*     Recent Labs     11/28/23  1151   APTT 33.6   INR 1.25*        IMAGING:   DX-PELVIS-1 OR 2 VIEWS   Final Result      No acute osseous abnormality.      DX-HAND 3+ LEFT   Final Result      Degenerative changes without acute fracture or dislocation. If pain persists, repeat radiographs in 7-10 days is recommended.      DX-CHEST-LIMITED (1 VIEW)   Final Result      No acute cardiopulmonary abnormality.      OUTSIDE IMAGES-CT CHEST/ABDOMEN/PELVIS   Final Result      OUTSIDE IMAGES-CT HEAD   Final Result      OUTSIDE IMAGES-CT CERVICAL SPINE "   Final Result      OUTSIDE IMAGES-CT FACE   Final Result      CT-HEAD W/O    (Results Pending)       IMPRESSION AND PLAN:  Trauma  Unwitnessed ground level fall on clopidogrel and aspirin   Trauma Yellow Transfer Activation from [facility] in [place], [place].  Darnell Membreno MD. Trauma Surgery.       Encounter for current long term use of antiplatelet drug  Clopidogrel and aspirin.  Thromboelastogram with 93.4 % AA inhibition and 98.5 % ADP inhibtion.  Transfused 1 unit of platelets.  Holding maintenance medication during acute traumatic illness.    Intracranial hemorrhage, subarachnoid (HCC)  Outside imaging with left frontal, left occipital and right parietal subarachnoid and intraparenchymal hemorrhages. Additional small fluid collections of intraparenchymal and subarachnoid blood near frontal vertices.  No midline shift, hydrocephalus, or extra-axial fluid collections.   mBIG 3   Non-operative management.  Follow up CT head in 6 hrs.   Post traumatic pharmacologic seizure prophylaxis for 1 week.  Giovanny Bergeron MD, PhD. Neurosurgeon. [facility].      Hypothyroid  Chronic condition treated with levothyroxine   Resumed maintenance medication on admission.      Dyslipidemia  Chronic condition treated with atorvastatin  Resumed maintenance medication on admission.    Contraindication to deep vein thrombosis (DVT) prophylaxis  VTE prophylaxis initially contraindicated secondary to elevated bleeding risk.  11/30 Trauma surveillance venous duplex ultrasonography ordered.      Depression  Chronic condition treated with bupropion  Resumed maintenance medication on admission.      Recto-vaginal fistula  Stool found in vagina on admission.   Referring facility imaging with a large amount of stool in rectum which is distended.  No stool after being cleaned up.     Arrhythmia  History of SVT.  Chronic condition treated with metoprolol .  Holding maintenance medication during acute traumatic  illness.      Dementia (HCC)  Confused at baseline per family report.    Hiatal hernia  Referring facility imaging with large hiatal hernia.    Abnormal renal function  Referring facility BUN 48.0 and creatinine 2.25.  Received 1 liter LR bolus prior to arrival.  Naqvi catheter placement ICU.   IV hydration and trend laboratory studies. Follow up BMP 1800.      Compression fracture of T5 vertebra (HCC)  Age indeterminate T5 fracture.       ____________________________________   Darnell Membreno MD, FACS      DD: 11/28/2023   DT: 12:53 PM

## 2023-11-28 NOTE — DISCHARGE PLANNING
Trauma Response    Referral: Trauma Yellow Transfer Response    Intervention: SW responded to trauma Yellow.  Pt was BIB [facility] after GLF/head bleed.  Pt was alert upon arrival.  Pts name is Mercedes Rollins (: 1932).  SW obtained the following pt information: Per EMS pt tripped at her house in [place], outside and was able to get up and go back inside.  Family took pt to ER due to her facial injuries and bleeding from her head, it is possible pt tripped over a dog.  SW was told be EMS that granddaughter is on her way, EMS provided grandson's name and phone number:  Richie Madrid [phone]    Plan: SW will remain available to support as necessary.

## 2023-11-28 NOTE — CARE PLAN
The patient is Watcher - Medium risk of patient condition declining or worsening    Shift Goals  Clinical Goals: Repeat head CT  Patient Goals: Rest  Family Goals: DONNA    Progress made toward(s) clinical / shift goals:    Problem: Knowledge Deficit - Standard  Goal: Patient and family/care givers will demonstrate understanding of plan of care, disease process/condition, diagnostic tests and medications  11/28/2023 1437 by Betty Man R.N.  Outcome: Progressing  11/28/2023 1437 by Betty Man R.N.  Outcome: Progressing     Problem: Fall Risk  Goal: Patient will remain free from falls  11/28/2023 1437 by Betty Man R.N.  Outcome: Progressing  11/28/2023 1437 by MUKESH LucioN.  Outcome: Progressing     Problem: Pain - Standard  Goal: Alleviation of pain or a reduction in pain to the patient’s comfort goal  Outcome: Progressing

## 2023-11-28 NOTE — ASSESSMENT & PLAN NOTE
History of SVT.  Chronic condition treated with metoprolol .  Holding maintenance medication during acute traumatic illness.

## 2023-11-28 NOTE — PROGRESS NOTES
Highly complex Lopez insertion. Profound amounts of stool were present in the vaginal canal. Vagina needed to be cleaned of stool in order to attempt lopez placement (clean took about an hour). Multiple attempt due to complex anatomy. Patient arrived in a heavily soiled brief with dried stool on feet, legs, toes buttocks and perineum. Dr Membreno presented to bedside and updated on events.

## 2023-11-28 NOTE — ASSESSMENT & PLAN NOTE
VTE prophylaxis initially contraindicated secondary to elevated bleeding risk.  11/30 Trauma surveillance venous duplex ultrasonography ordered.

## 2023-11-28 NOTE — ASSESSMENT & PLAN NOTE
Outside imaging with left frontal, left occipital and right parietal subarachnoid and intraparenchymal hemorrhages. Additional small fluid collections of intraparenchymal and subarachnoid blood near frontal vertices.  No midline shift, hydrocephalus, or extra-axial fluid collections.   mBIG 3   Non-operative management.  Interval head CT with hemispheric right subdural hematoma measuring 6 mm in maximal thickness, slightly more conspicuous. Increased multifocal bilateral subarachnoid hemorrhage.  Interval head CT reviewed by Neurosurgery.  Post traumatic pharmacologic seizure prophylaxis for 1 week.  Giovanny Bergeron MD, PhD. Neurosurgeon. [facility]. (Signed off 11/29)

## 2023-11-28 NOTE — ASSESSMENT & PLAN NOTE
Clopidogrel and aspirin.  Thromboelastogram with 93.4 % AA inhibition and 98.5 % ADP inhibtion.  Transfused 1 unit of platelets.  Repeat Thromboelastogram with 76.8% AA inhibition and 90.5% ADP inhibition. Given head CT is stable per Neurosurgery, will not transfuse platelets.  Holding maintenance medication during acute traumatic illness.

## 2023-11-28 NOTE — ED NOTES
"Patient BIB as trauma yellow transfer from [facility] via [facility] and [facility]. 89 y.o. female involved in GLF at home in [place] following a reported trip over her canine , or a fall down \"a few stairs\" - family/pt unclear as to which. Found down by family and presented to [facility] facility. + thinners, + head strike, no presumed LOC. + ICH detected via imaging at referring facility. No platelets on hand at referring.     VSS en route, on room air. AOx2 for EMS.     Patient's cervical spine cleared in prior ED. No c/o pain anywhere for EMS.     Medications administered en route: None.   Home Medications: ASA, Plavix. Last taken yesterday. + metoprolol, atorvastatin.   Allergies: None.   Medical Hx: Cardiomegaly, hiatal hernia, ? HTN/hyperlipidemia.    "

## 2023-11-28 NOTE — ASSESSMENT & PLAN NOTE
Stool found in vagina on admission.   Referring facility imaging with a large amount of stool in rectum which is distended.  No stool after being cleaned up.

## 2023-11-28 NOTE — ASSESSMENT & PLAN NOTE
Unwitnessed ground level fall on clopidogrel and aspirin   Trauma Yellow Transfer Activation from [facility] in [place], [place].  Darnell Membreno MD. Trauma Surgery.

## 2023-11-28 NOTE — ASSESSMENT & PLAN NOTE
Referring facility BUN 48.0 and creatinine 2.25.  Received 1 liter LR bolus prior to arrival.  Naqvi catheter placement ICU.   IV hydration and trend laboratory studies.   11/29 Repeat BMP with improved renal indices.   - MIVF decreased.  - 250 cc bolus post contrast study

## 2023-11-28 NOTE — PROGRESS NOTES
Med rec complete per [facility] and rec outside. Pt is unable to interviewed at this time. No family at bedside.    Called pt's son- not a working number.

## 2023-11-28 NOTE — PROGRESS NOTES
1218: Pt from ED ([facility]) to T910 accompanied by ED technician alec ALANIS on room air and transport zoll. Pt pleasantly confused, AOx1-2.    Gtts infusing on arrival (see MAR for titrations): None.    VS:  HR: 75 BPM  BP (cuff): 132/72  RR: 15/m  SpO2: 94% (RA)  T (temporal): 97.4F    4 Eyes Skin Exam completed by Jordyn RICHARD RN and Betty THOMAS RN. Transcribed as follows:  Generalized cachexia appreciated.   Head- Bruising, Swelling, and Redness - Large amount purple periorbital bruising to L eye, minimal to inner canthus area of R eye. Laceration with associated bruising to L frontal region - local wound care provided in ED.   Ears- Mastoid bruising behind L ear.  Nose- Redness.Bruising to nasal bridge.   Mouth- Lips intact, dried/old blood in oral cavity.  Neck- WDL.  Breast/Chest- Bruising (large, purple) to L breast.  Shoulder Blades- WDL - mild bruising just below shoulder blades.   Spine- Redness, Non-Blanching, and Bruising - Large, old operative scar (linear) to midline thoracolumbar area, bruise (minimally blanching) to tailbone region and milder bruising to lower back.   (R) Arm/Elbow/Hand- Bruising (diffuse, elbow to hand). Multiple fingertips split and calloused.   (L) Arm/Elbow/Hand- Bruising to enitrety of extremity (diffuse), skin tears to dorsum of hand. Multiple fingertips split and calloused.   Abdomen- WDL barring distended bladder.   Groin- Redness to perineum/vulva. *Stool appears to be leaking from the vagina. Trauma notified.   Scrotum/Coccyx/Buttocks- Redness, Non-Blanching, and Discoloration - diffuse, severe bruising to buttocks, perianal redness/bruising. Bruising also noted beneath gluteal folds.   (R) Leg- Redness, Bruising, and Swelling (diffuse ant/post) + dried/crusted tear to posterior lower leg. Scabbing.  (L) Leg- Redness and Bruising (generalized about entire extremity, ant/post).  (R) Heel/Foot/Toe- Redness + bruising/discoloration. Dry, calloused feet, curled/discolored  great toe nails.   (L) Heel/Foot/Toe- Redness + bruising/discoloration. Dry, calloused feet, curled/discolored great toe nails. Blood oozing around great toe as well - cleansed.     Comprehensive soap and water bath/hair wash performed immediately upon arrival to cleanse patient of copious dried blood present.    Devices In Place: ECG, Blood Pressure Cuff, Pulse Ox, and SCD's.  Interventions In Place: Sacral Mepilex, TAP System, Pillows, Q2 Turns, Low Air Loss Mattress, and Heels Loaded W/Pillows.  Possible Skin Injury: Yes.  Pictures Uploaded Into Epic: Yes. See below.                                         Wound Consult Placed: Yes.  RN Wound Prevention Protocol Ordered: Yes.    Pt belongings: Gold ring (x1) - placed in denture cup and into room closet.     1238: Bladder scan performed. >761 mL and exceeds display result. Naqvi catheter and rectal tube placed.    1305: EKG at bedside.     1340: Following unsuccessful attempts to reach patient's listed contact (grandsonRichie), we were able to reach him to obtain consent for platelet transfusion. Richie informed us that pt's primary decision-maker is her daughter, Cheryl Ortiz ([phone]). Phone number placed in chart. Cheryl provided consent - platelets en route.

## 2023-11-28 NOTE — ED PROVIDER NOTES
"ED PHYSICIAN NOTE    CHIEF COMPLAINT  Trauma yellow    EXTERNAL RECORDS REVIEWED  Records from transferring hospital.      CT scan of the chest abdomen pelvis-mild age-indeterminate T5 fracture.  Hiatal hernia.  Low-attenuation bilateral kidneys due to prior infarcts or previous infection    CT head shows very large amount of left facial soft tissue swelling with left frontal skin wound.  There is acute intracranial left frontal left occipital right parietal subarachnoid and intraparenchymal hemorrhage.  Small collections of intraparenchymal and subarachnoid blood bilateral frontal near vertex.    CT C-spine no evidence of fracture.    HPI/ROS    OUTSIDE HISTORIAN(S):  EMS vital signs have been stable    Mercedes Rollins is a 89 y.o. female who presents to the ER after presumed ground-level fall.  When I spoke with outside physician and was told that the patient tripped over her dog.  Paramedics tell me that the patient was found on the ground at the house and seemed confused therefore taken to the hospital.  At other hospital she was identified to have intracranial hemorrhage.  He was referred here for higher level of care.  She is on dual antiplatelet therapy.  They do not have platelets at outside hospital.    PAST MEDICAL HISTORY  SVT, hypothyroid, memory deficits    SOCIAL HISTORY       CURRENT MEDICATIONS  Home Medications    **Home medications have not yet been reviewed for this encounter**         ALLERGIES  Not on File    PHYSICAL EXAM  VITAL SIGNS: /88   Pulse 61   Temp (!) 35.6 °C (96 °F)   Resp (!) 24   Ht 1.47 m (4' 9.87\")   Wt 47 kg (103 lb 9.9 oz)   SpO2 99% Comment: RA  BMI 21.75 kg/m²    Constitutional: Awake and alert.  Speaking clearly  HENT: Contusion and skin tear left forehead.  No suturable lacerations.  There is swelling over the left face and around the left eye  Eyes: No conjunctival hemorrhage lateral left eye.  Pupils equal round reactive to light  Neck: Grossly normal range of " motion..  Nontender  Cardiovascular: Normal heart rate, Normal rhythm.  Symmetric peripheral pulses.   Thorax & Lungs: No respiratory distress, No wheezing, No rales, No rhonchi, No chest tenderness.   Abdomen: Bowel sounds normal, soft, non-distended, nontender, no mass  Skin: Multiple different age areas of ecchymosis over bilateral lower extremities and upper extremities.  Small skin tear left hand with bruising over the dorsum of the left hand  Back: No midline tenderness  Extremities: Tenderness and bruising of the left hand  Neurologic: Awake and alert.  Can move all extremities.  Clear speech.      DIAGNOSTIC STUDIES / PROCEDURES  LABS/EKG  Results for orders placed or performed during the hospital encounter of 11/28/23   DIAGNOSTIC ALCOHOL   Result Value Ref Range    Diagnostic Alcohol <10.1 <10.1 mg/dL   Comp Metabolic Panel   Result Value Ref Range    Sodium 134 (L) 135 - 145 mmol/L    Potassium 4.8 3.6 - 5.5 mmol/L    Chloride 99 96 - 112 mmol/L    Co2 18 (L) 20 - 33 mmol/L    Anion Gap 17.0 (H) 7.0 - 16.0    Glucose 107 (H) 65 - 99 mg/dL    Bun 49 (H) 8 - 22 mg/dL    Creatinine 2.07 (H) 0.50 - 1.40 mg/dL    Calcium 9.5 8.5 - 10.5 mg/dL    Correct Calcium 9.8 8.5 - 10.5 mg/dL    AST(SGOT) 44 12 - 45 U/L    ALT(SGPT) 25 2 - 50 U/L    Alkaline Phosphatase 83 30 - 99 U/L    Total Bilirubin 0.7 0.1 - 1.5 mg/dL    Albumin 3.6 3.2 - 4.9 g/dL    Total Protein 6.5 6.0 - 8.2 g/dL    Globulin 2.9 1.9 - 3.5 g/dL    A-G Ratio 1.2 g/dL   CBC WITHOUT DIFFERENTIAL   Result Value Ref Range    WBC 18.7 (H) 4.8 - 10.8 K/uL    RBC 3.44 (L) 4.20 - 5.40 M/uL    Hemoglobin 10.0 (L) 12.0 - 16.0 g/dL    Hematocrit 31.2 (L) 37.0 - 47.0 %    MCV 90.7 81.4 - 97.8 fL    MCH 29.1 27.0 - 33.0 pg    MCHC 32.1 (L) 32.2 - 35.5 g/dL    RDW 53.6 (H) 35.9 - 50.0 fL    Platelet Count 233 164 - 446 K/uL    MPV 9.6 9.0 - 12.9 fL   Prothrombin Time   Result Value Ref Range    PT 15.8 (H) 12.0 - 14.6 sec    INR 1.25 (H) 0.87 - 1.13   APTT    Result Value Ref Range    APTT 33.6 24.7 - 36.0 sec   PLATELET MAPPING WITH BASIC TEG   Result Value Ref Range    Reaction Time Initial-R 4.9 4.6 - 9.1 min    React Time Initial Hep 5.0 4.3 - 8.3 min    Clot Kinetics-K 1.0 0.8 - 2.1 min    Clot Angle-Angle 75.7 63.0 - 78.0 degrees    Maximum Clot Strength-MA 65.6 52.0 - 69.0 mm    TEG Functional Fibrinogen(MA) 21.4 15.0 - 32.0 mm    Lysis 30 minutes-LY30 0.0 0.0 - 2.6 %    % Inhibition ADP 98.5 (H) 0.0 - 17.0 %    % Inhibition AA 93.4 (H) 0.0 - 11.0 %    TEG Algorithm Link Algorithm    COD - Adult (Type and Screen)   Result Value Ref Range    ABO Grouping Only O     Rh Grouping Only POS     Antibody Screen-Cod NEG    PLATELETS REQUEST   Result Value Ref Range    Component P       P07                 Plts,Pheresis       [id number]   transfused   11/28/23   13:51      Product Type Platelets  Pheresis LR     Dispense Status transfused     Unit Number (Barcoded) [id number]     Product Code (Barcoded) [id number]     Blood Type (Barcoded) 5100    ESTIMATED GFR   Result Value Ref Range    GFR (CKD-EPI) 22 (A) >60 mL/min/1.73 m 2   ABO Rh Confirm   Result Value Ref Range    ABO Rh Confirm O POS    PLATELET MAPPING WITH BASIC TEG   Result Value Ref Range    Reaction Time Initial-R 4.4 (L) 4.6 - 9.1 min    React Time Initial Hep 4.6 4.3 - 8.3 min    Clot Kinetics-K 1.3 0.8 - 2.1 min    Clot Angle-Angle 72.6 63.0 - 78.0 degrees    Maximum Clot Strength-MA 60.3 52.0 - 69.0 mm    TEG Functional Fibrinogen(MA) 17.4 15.0 - 32.0 mm    Lysis 30 minutes-LY30 0.0 0.0 - 2.6 %    % Inhibition ADP 90.5 (H) 0.0 - 17.0 %    % Inhibition AA 76.8 (H) 0.0 - 11.0 %    TEG Algorithm Link Algorithm    URINALYSIS    Specimen: Urine, Naqvi Cath   Result Value Ref Range    Color Yellow     Character Clear     Specific Gravity 1.016 <1.035    Ph 5.5 5.0 - 8.0    Glucose Negative Negative mg/dL    Ketones Negative Negative mg/dL    Protein Negative Negative mg/dL    Bilirubin Negative Negative     Urobilinogen, Urine 0.2 Negative    Nitrite Negative Negative    Leukocyte Esterase Negative Negative    Occult Blood Small (A) Negative    Micro Urine Req Microscopic    URINE MICROSCOPIC (W/UA)   Result Value Ref Range    WBC 0-2 /hpf    RBC 0-2 /hpf    Bacteria Negative None /hpf    Epithelial Cells Negative /hpf    Hyaline Cast 0-2 /lpf   Basic Metabolic Panel   Result Value Ref Range    Sodium 133 (L) 135 - 145 mmol/L    Potassium 4.7 3.6 - 5.5 mmol/L    Chloride 101 96 - 112 mmol/L    Co2 18 (L) 20 - 33 mmol/L    Glucose 72 65 - 99 mg/dL    Bun 49 (H) 8 - 22 mg/dL    Creatinine 1.82 (H) 0.50 - 1.40 mg/dL    Calcium 8.6 8.5 - 10.5 mg/dL    Anion Gap 14.0 7.0 - 16.0   CREATINE KINASE   Result Value Ref Range    CPK Total 753 (H) 0 - 154 U/L   ESTIMATED GFR   Result Value Ref Range    GFR (CKD-EPI) 26 (A) >60 mL/min/1.73 m 2   CBC with Differential: Tomorrow AM   Result Value Ref Range    WBC 14.4 (H) 4.8 - 10.8 K/uL    RBC 2.85 (L) 4.20 - 5.40 M/uL    Hemoglobin 8.3 (L) 12.0 - 16.0 g/dL    Hematocrit 24.4 (L) 37.0 - 47.0 %    MCV 85.6 81.4 - 97.8 fL    MCH 29.1 27.0 - 33.0 pg    MCHC 34.0 32.2 - 35.5 g/dL    RDW 51.4 (H) 35.9 - 50.0 fL    Platelet Count 263 164 - 446 K/uL    MPV 9.7 9.0 - 12.9 fL    Neutrophils-Polys 72.10 (H) 44.00 - 72.00 %    Lymphocytes 9.80 (L) 22.00 - 41.00 %    Monocytes 4.90 0.00 - 13.40 %    Eosinophils 0.00 0.00 - 6.90 %    Basophils 0.00 0.00 - 1.80 %    Nucleated RBC 0.00 0.00 - 0.20 /100 WBC    Neutrophils (Absolute) 12.04 (H) 1.82 - 7.42 K/uL    Lymphs (Absolute) 1.41 1.00 - 4.80 K/uL    Monos (Absolute) 0.71 0.00 - 0.85 K/uL    Eos (Absolute) 0.00 0.00 - 0.51 K/uL    Baso (Absolute) 0.00 0.00 - 0.12 K/uL    NRBC (Absolute) 0.00 K/uL   Basic Metabolic Panel (BMP): Tomorrow AM   Result Value Ref Range    Sodium 136 135 - 145 mmol/L    Potassium 4.4 3.6 - 5.5 mmol/L    Chloride 106 96 - 112 mmol/L    Co2 19 (L) 20 - 33 mmol/L    Glucose 98 65 - 99 mg/dL    Bun 51 (H) 8 - 22 mg/dL     Creatinine 1.74 (H) 0.50 - 1.40 mg/dL    Calcium 8.2 (L) 8.5 - 10.5 mg/dL    Anion Gap 11.0 7.0 - 16.0   ESTIMATED GFR   Result Value Ref Range    GFR (CKD-EPI) 27 (A) >60 mL/min/1.73 m 2   DIFFERENTIAL MANUAL   Result Value Ref Range    Bands-Stabs 11.50 (H) 0.00 - 10.00 %    Metamyelocytes 1.70 %    Manual Diff Status PERFORMED    PERIPHERAL SMEAR REVIEW   Result Value Ref Range    Peripheral Smear Review see below    PLATELET ESTIMATE   Result Value Ref Range    Plt Estimation Normal    MORPHOLOGY   Result Value Ref Range    RBC Morphology Normal    EKG   Result Value Ref Range    Report       [facility]    Test Date:  2023  Pt Name:    VALORIE LOAIZA                Department: ER  MRN:        [id number]                      Room:       [facility]  Gender:     Female                       Technician: JEANA  :        [id number]                   Requested By:KAELYN DIEGO  Order #:    [id number]                    Reading MD: Angelo Eubanks MD    Measurements  Intervals                                Axis  Rate:       69                           P:          76  VA:         211                          QRS:        50  QRSD:       104                          T:          74  QT:         445  QTc:        477    Interpretive Statements  SINUS RHYTHM  No previous ECG available for comparison  Electronically Signed On 2023 00:45:18 PST by Angelo Eubanks MD     POCT glucose device results   Result Value Ref Range    POC Glucose, Blood 66 65 - 99 mg/dL   POCT glucose device results   Result Value Ref Range    POC Glucose, Blood 68 65 - 99 mg/dL   POCT glucose device results   Result Value Ref Range    POC Glucose, Blood 74 65 - 99 mg/dL   POCT glucose device results   Result Value Ref Range    POC Glucose, Blood 102 (H) 65 - 99 mg/dL   POCT glucose device results   Result Value Ref Range    POC Glucose, Blood 89 65 - 99 mg/dL          RADIOLOGY  I have independently interpreted the diagnostic imaging  associated with this visit and am waiting the final reading from the radiologist.   My preliminary interpretation is as follows: Chest x-ray without pneumothorax  Radiologist interpretation:   OUTSIDE IMAGES-CT CHEST/ABDOMEN/PELVIS   Final Result      OUTSIDE IMAGES-CT HEAD   Final Result      OUTSIDE IMAGES-CT CERVICAL SPINE   Final Result      OUTSIDE IMAGES-CT FACE   Final Result      DX-CHEST-LIMITED (1 VIEW)    (Results Pending)   DX-PELVIS-1 OR 2 VIEWS    (Results Pending)   DX-HAND 3+ LEFT    (Results Pending)         COURSE & MEDICAL DECISION MAKING    INITIAL ASSESSMENT, COURSE AND PLAN  Care Narrative: Patient presents with intracranial hemorrhage after reported ground-level fall.  Patient does not recall this event.  She is awake alert oriented to person and place.  She can move all extremities neuro intact.  She is on dual antiplatelet therapy.  Ordered tag.  Obtain basic labs.  Additional x-rays were ordered.  Ordered platelet pheresis.  Paged neurosurgery.    I discussed case with Dr. Bergeron.  Agrees with platelets.  He will evaluate the patient.  Patient not a surgical candidate.  Should be admitted to ICU.  Trauma surgery was paged          DISPOSITION AND DISCUSSIONS  I have discussed management of the patient with the following physicians and OLGA's: Dr. Allen, neurosurgery; Dr. Membreno, trauma surgery      FINAL IMPRESSION  1.  Traumatic intracranial hemorrhage  2.  Skin tears  3.  Multiple contusions    This dictation was created using voice recognition software. The accuracy of the dictation is limited to the abilities of the software. I expect there may be some errors of grammar and possibly content. The nursing notes were reviewed and certain aspects of this information were incorporated into this note.    Electronically signed by: Josias Ambriz M.D., 11/28/2023

## 2023-11-29 PROBLEM — K52.9 COLITIS: Status: ACTIVE | Noted: 2023-01-01

## 2023-11-29 PROBLEM — Z71.89 ACP (ADVANCE CARE PLANNING): Status: ACTIVE | Noted: 2023-01-01

## 2023-11-29 PROBLEM — N17.9 ACUTE KIDNEY INJURY (HCC): Status: ACTIVE | Noted: 2023-01-01

## 2023-11-29 PROBLEM — Z01.89 ENCOUNTER FOR GERIATRIC ASSESSMENT: Status: ACTIVE | Noted: 2023-01-01

## 2023-11-29 PROBLEM — N82.3 RECTOVAGINAL FISTULA: Status: ACTIVE | Noted: 2023-01-01

## 2023-11-29 PROBLEM — R33.9 URINARY RETENTION: Status: ACTIVE | Noted: 2023-01-01

## 2023-11-29 PROBLEM — Z02.9 DISCHARGE PLANNING ISSUES: Status: ACTIVE | Noted: 2023-01-01

## 2023-11-29 NOTE — ASSESSMENT & PLAN NOTE
Date of admission: 11/28/2023.  11/29 Transfer orders from SICU.  11/29 SNF referral.  Cleared for discharge: No.  Discharge delayed: No.  Discharge date: tbd.

## 2023-11-29 NOTE — THERAPY
Speech Language Pathology   Clinical Swallow Evaluation     Patient Name: Mercedes Rollins  AGE:  89 y.o., SEX:  female  Medical Record #: [id number]  Date of Service: 11/29/2023      History of Present Illness  89 y.o. female admitted on 11/28/2023 with scattered intracranial traumatic SAH, R SDH s/p GLF.    PMHx Dementia, depression, hypothyroid, hiatal hernia    No previous h/o SLP services at [facility]    CXR  11/28/2023  No focal consolidation, pleural effusion, pulmonary edema or pneumothorax.    CT-Head 11/28/2023  1. Hemispheric right subdural hematoma measuring 6 mm in maximal thickness, slightly more conspicuous.  2. Increased multifocal bilateral subarachnoid hemorrhage.  3. Stable right temporal hemorrhagic contusion.       General Information:  Vitals  O2 Delivery Device: None - Room Air  Level of Consciousness: Lethargic  Patient Behaviors: Confused, Lethargic  Orientation: Self  Follows Directives: Inconsistent      Prior Living Situation & Level of Function:  Prior Services: Unable To Determine At This Time  Lives with - Patient's Self Care Capacity: Unable To Determine At This Time  Communication: h/o dementia  Swallowing: Unable To Determine At This Time       Oral Mechanism Evaluation:  Dentition: Some missing dentition   Facial Symmetry: Equal  Facial Sensation: Equal     Labial Observations: WFL   Lingual Observations: Midline, Xerostomia  Motor Speech: WFL            Laryngeal Function:  Secretion Management: Adequate  Voice Quality:  ([place]ph[place])  Cough: Perceptually WNL         Subjective  Patient cleared by RN for evaluation. Pt received asleep, easily roused to verbal cues, often closed eyes during evaluation.      Assessment  Current Method of Nutrition: NPO until cleared by speech pathology  Positioning: Hayes's (60-90 degrees)  Bolus Administration: SLP, Patient    O2 Delivery Device: None - Room Air  Factor(s) Affecting Performance: Impaired endurance, Impaired mental status, Impaired command  "following  Tracheostomy : No            Swallowing Trials:  Swallowing Trials  Ice: WFL  Thin Liquid (TN0): WFL  Soft & Bite Sized (SB6): WFL  Regular (RG7): WFL      Comments: Oral care provided prior to PO presentation via suction toothbrush. Pt needing 1:1 feeding 2/2 generalized weakness. Adequate oral bolus acceptance and containment. Pharyngeal swallow response appeared timely. Prolonged mastication with trials of dry solids. Mild lingual residue with trials of dry solids, cleared with thin liquid rinse. Immediate cough response with trials of thin liquids via straw, no cough via cup sips. No increase WOB, vocal quality remained clear. No overt s/sx of aspiration appreciated.       Clinical Impressions  Patient presents with oral phase deficits, suspect r/t generalized weakness. Short-term diet modification is indicated. Service will follow to ensure diet tolerance and monitor for changes. Will consider diagnostic swallow study with any ongoing concerns for aspiration.        Recommendations  Diet Consistency: Soft/bite sized solids (SB6), thin liquids (TN0)  Instrumentation: None indicated at this time (will consider with any ongoing concerns)  Medication: Crush with applesauce, as appropriate, As tolerated  Supervision: 1:1 feeding with constant supervision, Encourage self-feeding  Positioning: Fully upright and midline during oral intake  Risk Management : No straws, Slow rate of intake, Small bites/sips, Physical mobility, as tolerated  Oral Care: Q6h         SLP Treatment Plan  Treatment Plan: Dysphagia Treatment, Patient/Family/Caregiver Training  SLP Frequency: 3x Per Week  Estimated Duration: Until Therapy Goals Met      Anticipated Discharge Needs  Discharge Recommendations: Other (TBD pending clinical progress)   Therapy Recommendations Upon DC: Dysphagia Training, Patient / Family / Caregiver Education        Patient / Family Goals  Patient / Family Goal #1: \"I love ice cream\"  Short Term " Goals  Short Term Goal # 1: Patient will consume a SB/TN diet with no overt s/sx of aspiration or decline in respiratory status      Polly Ahumada MS,CCC-SLP

## 2023-11-29 NOTE — PROGRESS NOTES
Patient to CT. Returned without event, daughter at bedside and updated on POC and anticipated next steps of hospitalization.

## 2023-11-29 NOTE — PROGRESS NOTES
Trauma / Surgical Daily Progress Note    Date of Service  11/29/2023    Chief Complaint  89 y.o. female admitted 11/28/2023 with Traumatic intracranial hemorrhage after an unwitnessed ground level fall on antiplatelet.    Interval Events  A/Ox1. Per family, baseline is A/Ox3.  Interval Head CT reviewed by neurosurgery - stable.  CT abdomen/pelvis to r/o rectovaginal fistula pending.  SLP recommendations reviewed.  Creatinine improving.    - Stable for transfer to neuro [place].  - Pulmonary hygiene.  - Mobilize out of bed, into chair.  - Monitor renal indices after CT.  - Geriatric consult  - SNF referral placed.    Review of Systems  Review of Systems   Unable to perform ROS: Mental acuity        Vital Signs  Pulse:  [65-96] 90  Resp:  [15-54] 17  BP: ()/(47-81) 122/58  SpO2:  [87 %-100 %] 94 %    Physical Exam  Physical Exam  Vitals and nursing note reviewed.   Constitutional:       General: She is awake.      Appearance: She is cachectic.      Interventions: Nasal cannula in place.   HENT:      Head:      Comments: Bruising, swelling, laceration left forehead     Ears:      Comments: Bruising behind left ear     Nose:      Comments: Redness, bruising to nasal bridge     Mouth/Throat:      Mouth: Mucous membranes are dry.      Pharynx: Oropharynx is clear.      Comments: Lips dry, old dry blood.  Eyes:      Pupils: Pupils are equal, round, and reactive to light.      Comments: Moderate periorbital bruising to left eye, minimal bruising to right inner canthus   Cardiovascular:      Rate and Rhythm: Normal rate and regular rhythm.      Pulses: Normal pulses.      Heart sounds: Normal heart sounds.   Pulmonary:      Effort: Pulmonary effort is normal. No respiratory distress.      Breath sounds: Decreased breath sounds present.   Chest:      Comments: Bruising left breast  Abdominal:      General: Bowel sounds are normal. There is no distension.      Palpations: Abdomen is soft.      Tenderness: There is no  abdominal tenderness. There is no guarding.   Genitourinary:     Comments: Naqvi with clear yellow urine    Musculoskeletal:         General: No swelling or tenderness.      Cervical back: Normal range of motion. No tenderness.   Skin:     General: Skin is warm and dry.      Capillary Refill: Capillary refill takes 2 to 3 seconds.      Findings: Bruising present.      Comments: Skin tears  Sacrum red   Neurological:      Mental Status: She is disoriented and confused.      Comments: A/Ox1   Psychiatric:         Attention and Perception: She is inattentive.         Cognition and Memory: Cognition is impaired.         Laboratory  Recent Results (from the past 24 hour(s))   PLATELET MAPPING WITH BASIC TEG    Collection Time: 11/28/23  5:39 PM   Result Value Ref Range    Reaction Time Initial-R 4.4 (L) 4.6 - 9.1 min    React Time Initial Hep 4.6 4.3 - 8.3 min    Clot Kinetics-K 1.3 0.8 - 2.1 min    Clot Angle-Angle 72.6 63.0 - 78.0 degrees    Maximum Clot Strength-MA 60.3 52.0 - 69.0 mm    TEG Functional Fibrinogen(MA) 17.4 15.0 - 32.0 mm    Lysis 30 minutes-LY30 0.0 0.0 - 2.6 %    % Inhibition ADP 90.5 (H) 0.0 - 17.0 %    % Inhibition AA 76.8 (H) 0.0 - 11.0 %    TEG Algorithm Link Algorithm    URINALYSIS    Collection Time: 11/28/23  5:39 PM    Specimen: Urine, Naqvi Cath   Result Value Ref Range    Color Yellow     Character Clear     Specific Gravity 1.016 <1.035    Ph 5.5 5.0 - 8.0    Glucose Negative Negative mg/dL    Ketones Negative Negative mg/dL    Protein Negative Negative mg/dL    Bilirubin Negative Negative    Urobilinogen, Urine 0.2 Negative    Nitrite Negative Negative    Leukocyte Esterase Negative Negative    Occult Blood Small (A) Negative    Micro Urine Req Microscopic    URINE MICROSCOPIC (W/UA)    Collection Time: 11/28/23  5:39 PM   Result Value Ref Range    WBC 0-2 /hpf    RBC 0-2 /hpf    Bacteria Negative None /hpf    Epithelial Cells Negative /hpf    Hyaline Cast 0-2 /lpf   Basic Metabolic Panel     Collection Time: 11/28/23  7:29 PM   Result Value Ref Range    Sodium 133 (L) 135 - 145 mmol/L    Potassium 4.7 3.6 - 5.5 mmol/L    Chloride 101 96 - 112 mmol/L    Co2 18 (L) 20 - 33 mmol/L    Glucose 72 65 - 99 mg/dL    Bun 49 (H) 8 - 22 mg/dL    Creatinine 1.82 (H) 0.50 - 1.40 mg/dL    Calcium 8.6 8.5 - 10.5 mg/dL    Anion Gap 14.0 7.0 - 16.0   CREATINE KINASE    Collection Time: 11/28/23  7:29 PM   Result Value Ref Range    CPK Total 753 (H) 0 - 154 U/L   ESTIMATED GFR    Collection Time: 11/28/23  7:29 PM   Result Value Ref Range    GFR (CKD-EPI) 26 (A) >60 mL/min/1.73 m 2   POCT glucose device results    Collection Time: 11/29/23 12:02 AM   Result Value Ref Range    POC Glucose, Blood 66 65 - 99 mg/dL   POCT glucose device results    Collection Time: 11/29/23 12:17 AM   Result Value Ref Range    POC Glucose, Blood 68 65 - 99 mg/dL   POCT glucose device results    Collection Time: 11/29/23 12:34 AM   Result Value Ref Range    POC Glucose, Blood 74 65 - 99 mg/dL   POCT glucose device results    Collection Time: 11/29/23  4:04 AM   Result Value Ref Range    POC Glucose, Blood 102 (H) 65 - 99 mg/dL   CBC with Differential: Tomorrow AM    Collection Time: 11/29/23  4:05 AM   Result Value Ref Range    WBC 14.4 (H) 4.8 - 10.8 K/uL    RBC 2.85 (L) 4.20 - 5.40 M/uL    Hemoglobin 8.3 (L) 12.0 - 16.0 g/dL    Hematocrit 24.4 (L) 37.0 - 47.0 %    MCV 85.6 81.4 - 97.8 fL    MCH 29.1 27.0 - 33.0 pg    MCHC 34.0 32.2 - 35.5 g/dL    RDW 51.4 (H) 35.9 - 50.0 fL    Platelet Count 263 164 - 446 K/uL    MPV 9.7 9.0 - 12.9 fL    Neutrophils-Polys 72.10 (H) 44.00 - 72.00 %    Lymphocytes 9.80 (L) 22.00 - 41.00 %    Monocytes 4.90 0.00 - 13.40 %    Eosinophils 0.00 0.00 - 6.90 %    Basophils 0.00 0.00 - 1.80 %    Nucleated RBC 0.00 0.00 - 0.20 /100 WBC    Neutrophils (Absolute) 12.04 (H) 1.82 - 7.42 K/uL    Lymphs (Absolute) 1.41 1.00 - 4.80 K/uL    Monos (Absolute) 0.71 0.00 - 0.85 K/uL    Eos (Absolute) 0.00 0.00 - 0.51 K/uL     Baso (Absolute) 0.00 0.00 - 0.12 K/uL    NRBC (Absolute) 0.00 K/uL   Basic Metabolic Panel (BMP): Tomorrow AM    Collection Time: 11/29/23  4:05 AM   Result Value Ref Range    Sodium 136 135 - 145 mmol/L    Potassium 4.4 3.6 - 5.5 mmol/L    Chloride 106 96 - 112 mmol/L    Co2 19 (L) 20 - 33 mmol/L    Glucose 98 65 - 99 mg/dL    Bun 51 (H) 8 - 22 mg/dL    Creatinine 1.74 (H) 0.50 - 1.40 mg/dL    Calcium 8.2 (L) 8.5 - 10.5 mg/dL    Anion Gap 11.0 7.0 - 16.0   ESTIMATED GFR    Collection Time: 11/29/23  4:05 AM   Result Value Ref Range    GFR (CKD-EPI) 27 (A) >60 mL/min/1.73 m 2   DIFFERENTIAL MANUAL    Collection Time: 11/29/23  4:05 AM   Result Value Ref Range    Bands-Stabs 11.50 (H) 0.00 - 10.00 %    Metamyelocytes 1.70 %    Manual Diff Status PERFORMED    PERIPHERAL SMEAR REVIEW    Collection Time: 11/29/23  4:05 AM   Result Value Ref Range    Peripheral Smear Review see below    PLATELET ESTIMATE    Collection Time: 11/29/23  4:05 AM   Result Value Ref Range    Plt Estimation Normal    MORPHOLOGY    Collection Time: 11/29/23  4:05 AM   Result Value Ref Range    RBC Morphology Normal    POCT glucose device results    Collection Time: 11/29/23  6:18 AM   Result Value Ref Range    POC Glucose, Blood 89 65 - 99 mg/dL       Fluids    Intake/Output Summary (Last 24 hours) at 11/29/2023 1424  Last data filed at 11/29/2023 1200  Gross per 24 hour   Intake 2589.79 ml   Output 2245 ml   Net 344.79 ml       Core Measures & Quality Metrics  Labs reviewed, Medications reviewed and Radiology images reviewed  Naqvi catheter: Urinary Tract Retention or Urinary Tract Obstruction      DVT Prophylaxis: Contraindicated - High bleeding risk  DVT prophylaxis - mechanical: SCDs  Ulcer prophylaxis: Yes    Assessed for rehab: Patient was assess for and/or received rehabilitation services during this hospitalization    RAP Score Total: 9    CAGE Results: not completed Blood Alcohol>0.08: no       Assessment/Plan  * Trauma- (present on  admission)  Assessment & Plan  Unwitnessed ground level fall on clopidogrel and aspirin   Trauma Yellow Transfer Activation from [facility] in [place], [place].  Darnell Membreno MD. Trauma Surgery.    Intracranial hemorrhage, subarachnoid (HCC)- (present on admission)  Assessment & Plan  Outside imaging with left frontal, left occipital and right parietal subarachnoid and intraparenchymal hemorrhages. Additional small fluid collections of intraparenchymal and subarachnoid blood near frontal vertices.  No midline shift, hydrocephalus, or extra-axial fluid collections.   mBIG 3   Non-operative management.  Interval head CT with hemispheric right subdural hematoma measuring 6 mm in maximal thickness, slightly more conspicuous. Increased multifocal bilateral subarachnoid hemorrhage.  Interval head CT reviewed by Neurosurgery.  Post traumatic pharmacologic seizure prophylaxis for 1 week.  Giovanny Bergeron MD, PhD. Neurosurgeon. [facility]. (Signed off 11/29)    Encounter for current long term use of antiplatelet drug- (present on admission)  Assessment & Plan  Clopidogrel and aspirin.  Thromboelastogram with 93.4 % AA inhibition and 98.5 % ADP inhibtion.  Transfused 1 unit of platelets.  Repeat Thromboelastogram with 76.8% AA inhibition and 90.5% ADP inhibition. Given head CT is stable per Neurosurgery, will not transfuse platelets.  Holding maintenance medication during acute traumatic illness.    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  - CT abdomen pelvis with rectal/IV contrast pending.    Abnormal renal function- (present on admission)  Assessment & Plan  Referring facility BUN 48.0 and creatinine 2.25.  Received 1 liter LR bolus prior to arrival.  Naqvi catheter placement ICU.   IV hydration and trend laboratory studies.   11/29 Repeat BMP with improved renal indices.   - MIVF decreased.    Arrhythmia- (present on  admission)  Assessment & Plan  History of SVT.  Chronic condition treated with metoprolol .  Holding maintenance medication during acute traumatic illness.    Contraindication to deep vein thrombosis (DVT) prophylaxis- (present on admission)  Assessment & Plan  VTE prophylaxis initially contraindicated secondary to elevated bleeding risk.  11/30 Trauma surveillance venous duplex ultrasonography ordered.    Encounter for geriatric assessment- (present on admission)  Assessment & Plan  11/29 The patient is 75 years old or older and a geriatrics consult is indicated  Travis Chappell MD, Geriatric Hospitalist.    Discharge planning issues- (present on admission)  Assessment & Plan  Date of admission: 11/28/2023.  11/29 Transfer orders from SICU.  11/29 SNF referral.  Cleared for discharge: No.  Discharge delayed: No.  Discharge date: tbd.    Advance care planning- (present on admission)  Assessment & Plan  Advance directive scanned into chart.  DNR/DNI.  11/29 Palliative care consult.    Compression fracture of T5 vertebra (HCC)- (present on admission)  Assessment & Plan  Age indeterminate T5 fracture.     Hiatal hernia- (present on admission)  Assessment & Plan  Referring facility imaging with large hiatal hernia.    Dementia (HCC)- (present on admission)  Assessment & Plan  Confused at baseline per family report.  Best neuro is A/O x 2-3.    Depression- (present on admission)  Assessment & Plan  Chronic condition treated with bupropion  Resumed maintenance medication on admission.    Dyslipidemia- (present on admission)  Assessment & Plan  Chronic condition treated with atorvastatin  Resumed maintenance medication on admission.    Hypothyroid- (present on admission)  Assessment & Plan  Chronic condition treated with levothyroxine   Resumed maintenance medication on admission.      Mental status adequate for full examination?: No A/Ox1    Spine cleared (radiologically and/or clinically): Yes    All current laboratory  studies/radiology exams reviewed: No, CT a/p in process    Medications reconciliation has been reviewed: Yes    Completed Consultations:  Neurosurgery     Pending Consultations:  Geriatric medicine    Newly identified diagnoses, injuries and/or co-morbidities:  Unable to identify any new injuries.       Discussed patient condition with RN, Patient, and trauma surgery, Dr. Gleason.

## 2023-11-29 NOTE — ASSESSMENT & PLAN NOTE
11/29 Questionable rectovaginal fistula.  - CT abdomen pelvis with rectal/IV contrast with no fistula.

## 2023-11-29 NOTE — DIETARY
"Nutrition services: Day 1 of admit.  Mercedes Rollins is a 89 y.o. female with admitting DX of trauma.   Consult received for poor PO.     RD able to visit pt and pt daughter at bedside. Pt with hx of dementia, currently A&Ox1-2. Daughter reports being away from patient for x1 month, unsure of her intake during this time. One meal documented at <25% so far. Pt daughter reports visually being able to see weight loss in patient, reports UBW of 100-104 lbs. No weight per chart review. No weight loss noted at this time. Daughter states pt likes most foods, pt seems unmotivated to eat at this time. RD offered protein supplements, pt daughter accepted. Recommended 1:1 supervision to encourage intake during meal times. Per NFPE pt with mild-moderate muscle wasting in the temporal, clavicle and masseter region.     Assessment:  Height: 147 cm (4' 9.87\")  Weight: 47 kg (103 lb 9.9 oz)  Body mass index is 21.75 kg/m²., BMI classification: Normal  Diet/Intake: Level 6 soft/bite-sized; Level 0 - thin liquids with 1:1 supervision    Evaluation:   Found down at home by family, unknown when she fell. Pt found with bruises and covered in stool. Hx of dementia.   Labs: Bun 51, Creatinine 1.74, GFR 27, Ca 8.2  Meds reviewed.   Skin: Laceration and skin tears, no PI.   +BM 11/29    Malnutrition Risk: Pt at risk with mild-moderate muscle wasting. No other criteria met at this time.     Recommendations/Plan:  Ensure compact TID, Magic cups with dinner.  Supplements will add an additional 950 kcal and 36 g protein/d.   Encourage intake of ~50%.   Document intake of all PO as % taken in ADL's to provide interdisciplinary communication across all shifts.   Monitor weight.  Nutrition rep will continue to see patient for ongoing meal and snack preferences.     RD following.     "

## 2023-11-29 NOTE — PROGRESS NOTES
Passed bedside swallow assessment , but had coughing with medications. Oral medicals held for 1800. SLP eval placed.

## 2023-11-29 NOTE — PROGRESS NOTES
Neurosurgery Progress Note    Subjective:  No acute events over night    Exam:  Oriented x1-2  WANG no focal deficit  Facial bruising    BP  Min: 99/47  Max: 154/58  Pulse  Av.9  Min: 60  Max: 96  Resp  Av.2  Min: 15  Max: 54  Temp  Av.2 °C (97.1 °F)  Min: 35.6 °C (96 °F)  Max: 37 °C (98.6 °F)  Monitored Temp 2  Av.7 °C (98.1 °F)  Min: 33.7 °C (92.7 °F)  Max: 37.9 °C (100.2 °F)  SpO2  Av.3 %  Min: 87 %  Max: 100 %    No data recorded    Recent Labs     23  11523  0405   WBC 18.7* 14.4*   RBC 3.44* 2.85*   HEMOGLOBIN 10.0* 8.3*   HEMATOCRIT 31.2* 24.4*   MCV 90.7 85.6   MCH 29.1 29.1   MCHC 32.1* 34.0   RDW 53.6* 51.4*   PLATELETCT 233 263   MPV 9.6 9.7     Recent Labs     23  1151 23  1929 23  0405   SODIUM 134* 133* 136   POTASSIUM 4.8 4.7 4.4   CHLORIDE 99 101 106   CO2 18* 18* 19*   GLUCOSE 107* 72 98   BUN 49* 49* 51*   CREATININE 2.07* 1.82* 1.74*   CALCIUM 9.5 8.6 8.2*     Recent Labs     23  115   APTT 33.6   INR 1.25*     Recent Labs     23  1151 23  1739   REACTMIN 4.9 4.4*   CLOTKINET 1.0 1.3   CLOTANGL 75.7 72.6   MAXCLOTS 65.6 60.3   [id number] 0.0 0.0   PRCINADP 98.5* 90.5*   PRCINAA 93.4* 76.8*       Intake/Output                         23 07 - 23 0659 23 07 - 2359      Total  Total                 Intake    I.V.  326.6  1188.9 1515.5  320.7  -- 320.7    Pre-Hospital Volume 0 -- 0 -- -- --    Trauma Resuscitation Volume 0 -- 0 -- -- --    Volume (mL) (NS infusion) 326.6 1188.9 1515.5 320.7 -- 320.7    Blood  677  -- 677  --  -- --    PRBC Total Volume (Non-Barcoded) 0 -- 0 -- -- --    FFP Total Volume (Non-Barcoded) 0 -- 0 -- -- --    Platelets Total Volume (Non-Barcoded) 0 -- 0 -- -- --    Cryoprecipitate (Pooled) Total Volume (Non-Barcoded) 0 -- 0 -- -- --    Volume (RELEASE PLATELET PHERESIS) 677 -- 677 -- -- --    Other  --  200 200  --  -- --    Medications  (PO/Enteral Liquids) -- 200 200 -- -- --    Total Intake 1003.6 1388.9 2392.5 320.7 -- 320.7       Output    Urine  1200  495 1695  150  -- 150    Output (mL) (Urethral Catheter Temperature probe 16 Fr.) 5485 155 1033 150 -- 150    Other  0  -- 0  --  -- --    Pre-Hospital Output 0 -- 0 -- -- --    Trauma Resuscitation Output 0 -- 0 -- -- --    Stool  300  100 400  --  -- --    Number of Times Stooled -- 1 x 1 x 2 x -- 2 x    Rectal Tube Output ([REMOVED] Rectal Tube 11/29/23 0400) 300 100 400 -- -- --    Blood  0  -- 0  --  -- --    Est. Blood Loss 0 -- 0 -- -- --    Total Output 0818 556 4593 150 -- 150       Net I/O     -496.4 793.9 297.5 170.7 -- 170.7              Intake/Output Summary (Last 24 hours) at 11/29/2023 1024  Last data filed at 11/29/2023 1000  Gross per 24 hour   Intake 2713.2 ml   Output 2245 ml   Net 468.2 ml             Respiratory Therapy Consult   Continuous RT    Pharmacy Consult Request  1 Each PHARMACY TO DOSE    ondansetron  4 mg Q4HRS PRN    ondansetron  4 mg Q4HRS PRN    docusate sodium  100 mg BID    senna-docusate  1 Tablet Nightly    senna-docusate  1 Tablet Q24HRS PRN    polyethylene glycol/lytes  1 Packet BID    magnesium hydroxide  30 mL DAILY    bisacodyl  10 mg Q24HRS PRN    sodium phosphate  1 Each Once PRN    NS   Continuous    acetaminophen  1,000 mg Q6HRS    Followed by    [START ON 12/3/2023] acetaminophen  1,000 mg Q6HRS PRN    oxyCODONE immediate-release  2.5 mg Q3HRS PRN    Or    oxyCODONE immediate-release  5 mg Q3HRS PRN    Or    HYDROmorphone  0.25 mg Q3HRS PRN    levETIRAcetam  500 mg Q12HRS    Or    levETIRAcetam (Keppra) IV  500 mg Q12HRS    famotidine  20 mg BID    Or    famotidine  20 mg BID    bacitracin-polymyxin b   TID    atorvastatin  40 mg DAILY    buPROPion SR  150 mg QAM    levothyroxine  50 mcg AM ES       Assessment and Plan:  Hospital day #2 right SDH and temp contusion  POD #na  Prophylactic anticoagulation: no         Start date/time: tbd      Follow  up CT reviewed by Dr Bergeron, stable  Neuro stable  Antiplatelet and anticoagulant medication contraindicated given severity and wide extent of bruising, intracranial hemorrhages  Ok for Q4hr neuro checks and transfer to the floor disposition pending    No surgical intervention needed at this point.  We will be signing off, but please let us know with any questions/concerns

## 2023-11-29 NOTE — DISCHARGE PLANNING
Agency/Facility Name: [facility]  Spoke To: Leda  Outcome: Patient accepted, can take patient after 3 midnight stay.    Agency/Facility Name: [facility]  Spoke To: Francisco Javier  Outcome: Patient accepted, can take patient after 3 midnight stay.

## 2023-11-29 NOTE — THERAPY
"Physical Therapy   Initial Evaluation     Patient Name: Mercedes Rollins  Age:  89 y.o., Sex:  female  Medical Record #: [id number]  Today's Date: 11/29/2023     Precautions  Precautions: Fall Risk    Assessment   89 year old female found down at home by family with unknown SHINE. Patient found with diffuse bruises and covered in stool. Pt found to have scattered intracranial traumatic SAH, R SDH, temporal contusion stable on repeat head CT   She was evaluated at [facility] and transferred here for subarachnoid hemorrhage. Daughter reports slow decline over last several months, possible dementia at baseline. Pt presents to PT EVAL pleasantly confused, impulsive and easily distracted. Pt needing Mod to Max VC throughout session demonstrating decreased independence with functional mobility and ambulation 2/2 decreased cog, impulsivity and decreased activity tolerance. Pt would benefit form post-acute placement, will continue to follow.    Plan    Physical Therapy Initial Treatment Plan   Treatment Plan : Bed Mobility, Gait Training, Neuro Re-Education / Balance, Self Care / Home Evaluation, Stair Training, Therapeutic Activities, Therapeutic Exercise  Treatment Frequency: 4 Times per Week  Duration: Until Therapy Goals Met    DC Equipment Recommendations: Unable to determine at this time  Discharge Recommendations: Recommend post-acute placement for additional physical therapy services prior to discharge home       Subjective  \"I'm doing well, thanks dear\"     Objective       11/29/23 1008    Services   Is patient using  services for this encounter? No   Initial Contact Note    Initial Contact Note Order Received and Verified, Physical Therapy Evaluation in Progress with Full Report to Follow.   Precautions   Precautions Fall Risk   Vitals   O2 (LPM) 0   O2 Delivery Device None - Room Air   Pain 0 - 10 Group   Pain Rating Scale (NPRS) 0   Therapist Pain Assessment Post Activity Pain Same as Prior to " Activity   Prior Living Situation   Prior Services None   Housing / Facility 1 Story House   Steps Into Home 3   Bathroom Set up Walk In Shower;Bathtub / Shower Combination   Equipment Owned Front-Wheel Walker   Lives with - Patient's Self Care Capacity Alone and Unable to Care For Self   Comments Pt lives in [place] alone, daughter at bedside reports a slow decline the last few months. Previously, pt was IND with ADLs, and received assistance from family for iADLS. Family would visit multiple times a day for med management and deliver groceries.   Prior Level of Functional Mobility   Bed Mobility Independent   Transfer Status Independent   Ambulation Independent   Ambulation Distance household   Assistive Devices Used Front-Wheel Walker   Stairs Independent   Comments daughter reports pt would use FWW inconsistently   History of Falls   History of Falls Yes   Date of Last Fall   (reason for admission, daughter and pt reports multiple falls recently)   Cognition    Cognition / Consciousness X   Orientation Level Not Oriented to Place;Not Oriented to Reason   Level of Consciousness Alert   Ability To Follow Commands 1 Step   Safety Awareness Impaired;Impulsive   New Learning Impaired   Attention Impaired   Comments Pt pleasantly confused and cooperative. Daughter reports increasing confusion the last several months. Possible dementia. Pt able to follow 1 step commands, easily distractable but can re-direct.   Active ROM Upper Body   Active ROM Upper Body  X   Comments BUE shoulder flexion limited to 90degrees, able to reach behind head   Strength Upper Body   Upper Body Strength  WDL   Sensation Upper Body   Upper Extremity Sensation  WDL   Active ROM Lower Body    Active ROM Lower Body  WDL   Strength Lower Body   Lower Body Strength  X   Gross Strength Generalized Weakness, Equal Bilaterally   Comments grossly BLE 4/5   Vision   Vision Comments wears glasses, near sided   Balance Assessment   Sitting Balance  (Static) Fair -   Sitting Balance (Dynamic) Fair -   Standing Balance (Static) Poor +   Standing Balance (Dynamic) Poor   Weight Shift Sitting Fair   Weight Shift Standing Poor   Comments w/HHA, pt with generalized weakness and poor trunk control, 1 large LOB at EOB with no protective strategies   Bed Mobility    Supine to Sit Minimal Assist   Scooting Minimal Assist   Rolling Minimal Assist to Rt.;Minimum Assist to Lt.   Comments Mod VC for  command following, easily distractible   Gait Analysis   Gait Level Of Assist Minimal Assist   Assistive Device Hand Held Assist   Distance (Feet) 3   # of Times Distance was Traveled 1   Deviation Bradykinetic;Shuffled Gait   Comments HHA x2, very impulsive   Functional Mobility   Sit to Stand Minimal Assist   Bed, Chair, Wheelchair Transfer Minimal Assist  (x2)   Transfer Method Stand Step   Mobility EOB to chair   Comments impulsive   ICU Target Mobility Level   ICU Mobility - Targeted Level Level 3B   How much difficulty does the patient currently have...   Turning over in bed (including adjusting bedclothes, sheets and blankets)? 3   Sitting down on and standing up from a chair with arms (e.g., wheelchair, bedside commode, etc.) 1   Moving from lying on back to sitting on the side of the bed? 3   How much help from another person does the patient currently need...   Moving to and from a bed to a chair (including a wheelchair)? 3   Need to walk in a hospital room? 3   Climbing 3-5 steps with a railing? 2   6 clicks Mobility Score 15   Activity Tolerance   Sitting in Chair post session   Sitting Edge of Bed 15 min   Standing 1 min   Edema / Skin Assessment   Edema / Skin  X   Comments multiple hematoma's on BLE/BUE 2/2 multiple falls   Short Term Goals    Short Term Goal # 1 In 6tx sessions pt will perform all bed mobility w/SPV for safe d/c   Short Term Goal # 2 In 6tx sessions pt will demo all functional transfers w/SPV/LRAD for safe d/c.   Short Term Goal # 3 In 6tx  sessions pt will ascend/descend 3 stairs w/LRAD/SPV for safe d/c   Short Term Goal # 4 In 6tx sessions pt will ambulate 100ft w/LRAD/SPV for safe d/c.   Education Group   Education Provided Role of Physical Therapist   Role of Physical Therapist Patient Response Patient;Family;Acceptance;Explanation;Verbal Demonstration   Additional Comments Education was provided on importance of OOB mobility for decreased risk of infection and deconditioning. Education/discussion provided on difference between post/acute, USP vs ILF.   Physical Therapy Initial Treatment Plan    Treatment Plan  Bed Mobility;Gait Training;Neuro Re-Education / Balance;Self Care / Home Evaluation;Stair Training;Therapeutic Activities;Therapeutic Exercise   Treatment Frequency 4 Times per Week   Duration Until Therapy Goals Met   Problem List    Problems Impaired Bed Mobility;Impaired Transfers;Impaired Ambulation;Functional ROM Deficit;Functional Strength Deficit;Impaired Balance;Impaired Coordination;Decreased Activity Tolerance;Safety Awareness Deficits / Cognition   Anticipated Discharge Equipment and Recommendations   DC Equipment Recommendations Unable to determine at this time   Discharge Recommendations Recommend post-acute placement for additional physical therapy services prior to discharge home   Interdisciplinary Plan of Care Collaboration   Patient Position at End of Therapy Seated;Chair Alarm On;Call Light within Reach;Family / Friend in Room;Tray Table within Reach   Collaboration Comments Rn aware   Session Information   Date / Session Number  11-29-1(1/4,12/5)   Emily Lockett, SPT

## 2023-11-29 NOTE — CARE PLAN
Problem: Pain - Standard  Goal: Alleviation of pain or a reduction in pain to the patient’s comfort goal  Outcome: Progressing     Problem: Skin Integrity  Goal: Skin integrity is maintained or improved  Outcome: Progressing   The patient is Stable - Low risk of patient condition declining or worsening    Shift Goals  Clinical Goals: comfort  Patient Goals: Rest  Family Goals: DONNA    Progress made toward(s) clinical / shift goals:  Turning and positioning Q2 hours,  heel meplex and float boots applied. Patient has denied pain or discomfort, is taking ATC Tylenol as ordered.

## 2023-11-29 NOTE — CARE PLAN
The patient is Stable - Low risk of patient condition declining or worsening    Shift Goals  Clinical Goals: PT/OT/SLP, wound, Dietary  Patient Goals: rest, comfort  Family Goals: rest, comfort, updates    Progress made toward(s) clinical / shift goals:  yes    Patient is not progressing towards the following goals:

## 2023-11-29 NOTE — ASSESSMENT & PLAN NOTE
11/29 The patient is 75 years old or older and a geriatrics consult is indicated  Travis Chappell MD, Geriatric Hospitalist.

## 2023-11-29 NOTE — CONSULTS
[facility] [place] [facility]  Called 1400 discussed with ER MD [id number] examined 1800  Reason for referral:  [facility]  Referring MD:  Dr. Ambriz    Author: Giovanny Bergeron M.D. Date & Time created: 2023  6:22 PM     Interval History   89 year old female found down at home covered in bruises.  She takes Plavix; Her TEG was grossly abnormal.  Platelets have been transfused.  GCS 14 in ER.    ROS per h/p    Physical Exam  Awake confused  Speech fluent  In no apparent distress  Pupils 3 mm midline, reactive.  Conjugate gaze.  Face symmetric  Facial sensation intact light touch  Hearing intact to conversation, light finger rub bilaterally  Motor:  Bilateral  bicep, tricep, , IP, dorsiflexion, 4/5  Sensation:  Intact touch face, neck, torso, four extremities    Patient Active Problem List    Diagnosis Date Noted    Encounter for current long term use of antiplatelet drug 2023    Intracranial hemorrhage, subarachnoid (HCC) 2023    Contraindication to deep vein thrombosis (DVT) prophylaxis 2023    Arrhythmia 2023    Abnormal renal function 2023    Multiple contusions 2023    Trauma 2023    Hypothyroid 2023    Dyslipidemia 2023    Depression 2023    Dementia (HCC) 2023    Hiatal hernia 2023    Compression fracture of T5 vertebra (HCC) 2023    Advance care planning 2023       Temp (24hrs), Av.2 °C (97.1 °F), Min:35.6 °C (96 °F), Max:37 °C (98.6 °F)    Pulse: 74, Respiration: 18, Blood Pressure : 118/63, Weight: 47 kg (103 lb 9.9 oz), Pulse Oximetry: 96 %, O2 (LPM): 0     Date 23 0700 - 23 0659   Shift 7497-7762 0917-6169 2747-0990 24 Hour Total   INTAKE   I.V. 0 326.6  326.6     Pre-Hospital Volume 0   0     Trauma Resuscitation Volume 0   0     Volume (mL) (NS infusion)  326.6  326.6   Blood 177 500  677     PRBC Total Volume (Non-Barcoded) 0   0     FFP Total Volume (Non-Barcoded) 0   0     Platelets Total Volume (Non-Barcoded) 0    0     Cryoprecipitate (Pooled) Total Volume (Non-Barcoded) 0   0     Volume (RELEASE PLATELET PHERESIS) 177 500  677   Shift Total 177 826.6  1003.6   OUTPUT   Urine  1200  1200     Output (mL) (Urethral Catheter Temperature probe 16 Fr.)  1200  1200   Other 0   0     Pre-Hospital Output 0   0     Trauma Resuscitation Output 0   0   Stool  300  300     Rectal Tube Output (Rectal Tube)  300  300   Blood 0   0     Est. Blood Loss 0   0   Shift Total 0 1500  1500    -673.4  -496.4         Intake/Output Summary (Last 24 hours) at 11/28/2023 1822  Last data filed at 11/28/2023 1800  Gross per 24 hour   Intake 1003.58 ml   Output 1500 ml   Net -496.42 ml       Rectal Tube (Active)   Skin Care Area cleansed;Protective Barrier Applied 11/28/23 1400   Skin Integrity Open Wound ( Create Wound LDA ) 11/28/23 1400   Flushed Per Protocol Yes 11/28/23 1400   Rectal Tube Output 300 mL 11/28/23 1800       Urethral Catheter Temperature probe 16 Fr. (Active)   Site Assessment Edema;Pink;Painful;Red;Clean 11/28/23 1400   Collection Container Standard drainage bag 11/28/23 1400   Urinary Catheter Care Tamper Evident Seal in Place;Drainage Tube Extended;Drainage Tubing Properly Secured;Drainage Bag Not Overfilled;Drainage Bag Below Bladder Level and Not on Floor;Cath Care Done with Soap & Water;Cath Care Done with CHG Wipes;All Wound Pts have Wound Consult 11/28/23 1400   Securement Method Securing device (Describe) 11/28/23 1400   Output (mL) 1200 mL 11/28/23 1800        Respiratory Therapy Consult        Pharmacy Consult Request  1 Each      ondansetron  4 mg      ondansetron  4 mg      docusate sodium  100 mg      senna-docusate  1 Tablet      senna-docusate  1 Tablet      polyethylene glycol/lytes  1 Packet      magnesium hydroxide  30 mL      bisacodyl  10 mg      sodium phosphate  1 Each      NS   100 mL/hr at 11/28/23 1414    acetaminophen  1,000 mg      Followed by    [START ON 12/3/2023] acetaminophen  1,000 mg       oxyCODONE immediate-release  2.5 mg      Or    oxyCODONE immediate-release  5 mg      Or    HYDROmorphone  0.25 mg      levETIRAcetam  500 mg      Or    levETIRAcetam (Keppra) IV  500 mg      famotidine  20 mg      Or    famotidine  20 mg      bacitracin-polymyxin b        atorvastatin  40 mg      buPROPion SR  150 mg      levothyroxine  50 mcg         Recent Labs     11/28/23  1151   WBC 18.7*   RBC 3.44*   HEMOGLOBIN 10.0*   HEMATOCRIT 31.2*   MCV 90.7   MCH 29.1   PLATELETCT 233     Recent Labs     11/28/23  1151   SODIUM 134*   POTASSIUM 4.8   CHLORIDE 99   CO2 18*   GLUCOSE 107*   BUN 49*   CREATININE 2.07*   CALCIUM 9.5     Recent Labs     11/28/23  1151   APTT 33.6   INR 1.25*     11/28/2023 3:34 PM     HISTORY/REASON FOR EXAM:  Head trauma, mod-severe.  Follow-up intracranial hemorrhage. Head injury     TECHNIQUE/EXAM DESCRIPTION AND NUMBER OF VIEWS:  CT of the head without contrast.     The study was performed on a helical multidetector CT scanner. Contiguous 2.5 mm axial sections were obtained from the skull base through the vertex.     Up to date radiation dose reduction adjustments have been utilized to meet ALARA standards for radiation dose reduction.     COMPARISON:  Head CT from outside facility 11/28/2023     FINDINGS:  Small hemispheric right subdural hematoma, most pronounced in the parasagittal right frontal region, measuring 6 mm in thickness.     Grossly stable hemorrhagic contusion in the right temporal lobe measuring about 1.7 cm with mild associated edema. There are multifocal bilateral areas of subarachnoid hemorrhage which appears slightly more conspicuous than on prior study. There is also   some subarachnoid hemorrhage likely present within the posterior fossa.     No midline shift or hydrocephalus.     Patchy chronic microvascular ischemic changes are again noted.     Complete opacification of left maxillary sinus.     Left frontal temporal scalp hematoma and laceration. No depressed  calvarial fracture.     IMPRESSION:     1. Hemispheric right subdural hematoma measuring 6 mm in maximal thickness, slightly more conspicuous.  2. Increased multifocal bilateral subarachnoid hemorrhage.  3. Stable right temporal hemorrhagic contusion.     AP:  89 year old female with scattered intracranial traumatic SAH, R SDH, temporal contusion stable on repeat head CT.  Platelets are being administered to reverse Plavix.  She is awake, follows commands  Recommend observation  Correct hyponatremia  No Neurosurgical intervention indicated  Antiplatelet and anticoagulant medication contraindicated given severity and wide extent of bruising, intracranial hemorrhages

## 2023-11-29 NOTE — DISCHARGE PLANNING
"Case Management Discharge Planning    Admission Date: 11/28/2023  GMLOS: 3.3  ALOS: 1    6-Clicks ADL Score:    6-Clicks Mobility Score:        Anticipated Discharge Dispo: Discharge Disposition: D/T to SNF with Medicare cert in anticipation of skilled care (03)    DME Needed: No    Action(s) Taken: DC Assessment Complete (See below) and Referral(s) sent  Pt discussed in rounds. Neuro following and will be signing off. Q4 neuro checks. CT reviewed, stable. SNF referral placed and referrals sent out per protocol. PT/OT/SLP recommendations pending. Assessment completed with pt's dtr/DPOA, Cheryl, at bedside (see below).    Escalations Completed: None    Medically Clear: No    Next Steps: LMSW will continue to follow for discharge planning needs.     Barriers to Discharge: Medical clearance, Pending Placement, and Pending PT Evaluation    Is the patient up for discharge tomorrow: No    Care Transition Team Assessment    LMSW met w/ pt's dtr, Cheryl, who is also pt's DPOA. Cheryl reports that the pt resides alone in her home located at [address] [place] [address] in [place], [place]. Pt's grandson and his wife who is an RN, pt's friends Cliff and Cedrick, and pt's ex dtr-in-law who is an RN all reside in [place] and check in on the pt regularly. Cheryl states that she resides in [place], [place] and pt's son Ronni resides in [place] and is on his way to [place] to see pt. Cheryl states that pt mostly \"sits and naps with the television on\" during the day. Pt's cooking has become less and less and she is \"settling more for sandwiches and tea\". Cheryl states that the pt has frozen meals that can be microwaved as well. Pt has two living adult children: Cheryl and Moose. Per Cheryl, pt has not officially been diagnosed with Dementia but it has been discussed with PCP that pt likely has dementia. Cheryl states that she is concerned that pt's needs are greater than family can provide at this time. Pt's preferred pharmacy is [facility] ([phone]). Pt does have a PCP and dtr will look up his name and provide to this writer later.       Information Source  Orientation Level: Oriented to person  Information Given By: Relative  Informant's Name: Cheryl Diana  Who is responsible for making decisions for patient? : Patient    Readmission Evaluation  Is this a readmission?: No    Elopement Risk  Legal Hold: No  Ambulatory or Self Mobile in Wheelchair: No-Not an Elopement Risk  Elopement Risk: Not at Risk for Elopement    Interdisciplinary Discharge Planning  Lives with - Patient's Self Care Capacity: Alone and Unable to Care For Self  Housing / Facility: [address] [facility]  Prior Services: None    Discharge Preparedness  What is your plan after discharge?: Skilled nursing facility  What are your discharge supports?: Child, Other (comment) (Grandson, friends and ex-dtr in law.)  Prior Functional Level: Needs Assist with Activities of Daily Living, Needs Assist with Medication Management, Ambulatory, Other (Comments) (Pt has a walker but doesn't use it.)  Difficulity with ADLs: None  Difficulity with IADLs: Cooking, Driving, Keeping track of finances, Laundry, Managing medication, Shopping    Functional Assesment  Prior Functional Level: Needs Assist with Activities of Daily Living, Needs Assist with Medication Management, Ambulatory, Other (Comments) (Pt has a walker but doesn't use it.)    Finances  Financial Barriers to Discharge: No  Prescription Coverage: Yes ([facility] ([phone]))    Advance Directive  Advance Directive?: DPOA for Health Care  Durable Power of  Name and Contact : Cheryl Ortiz ([phone])    Psychological Assessment  History of Substance Abuse: None  History of Psychiatric Problems: No    Discharge Risks or Barriers  Discharge risks or barriers?: Lives alone, no community support, Transportation  Patient risk factors: Cognitive / sensory / physical deficit, Lives alone and no community  support, Vulnerable adult    Anticipated Discharge Information  Discharge Disposition: D/T to SNF with Medicare cert in anticipation of skilled care (03)

## 2023-11-29 NOTE — THERAPY
"Occupational Therapy   Initial Evaluation     Patient Name: Mercedes Rollins  Age:  89 y.o., Sex:  female  Medical Record #: [id number]  Today's Date: 11/29/2023     Precautions: Fall Risk    Assessment  Patient is 89 y.o. female admitted for GLF and being found down. PMHx: hypothyroid, depression, arrhythmia, hiatal hernia, and dementia. Per family pt lives alone w/frequent/daily check ins from family/friends and assist w/IADL's.   This admission pt is dx w/SAH, abnormal renal function, compression fx of T5, multiple contusions, and rectovaginal fistula.   Today pt presents w/confusion, w/BUE in coordination, impaired balance, attention and overall functional mobility. Pt was pleasantly confused through out session and having active bowel incontinence. All impacting her safe participation in ADL's.     Plan  Occupational Therapy Initial Treatment Plan   Treatment Interventions: Self Care / Activities of Daily Living, Adaptive Equipment, Cognitive Skill Development, Community Re-Integration, Manual Therapy Techniques, Neuro Re-Education / Balance, Therapeutic Exercises, Therapeutic Activity  Treatment Frequency: 4 Times per Week  Duration: Until Therapy Goals Met    DC Equipment Recommendations: Unable to determine at this time  Discharge Recommendations: Recommend post-acute placement for additional occupational therapy services prior to discharge home     Subjective  \"Oh aren't you all just so nice\"      Objective     11/29/23 1015   Charge Group   OT Evaluation OT Evaluation High   Total Time Spent   OT Time Spent Yes   OT Evaluation (Minutes) 20   OT Total Time Spent (Calculated) 20    Services   Is patient using  services for this encounter? No   Initial Contact Note    Initial Contact Note Order Received and Verified, Occupational Therapy Evaluation in Progress with Full Report to Follow.   Prior Living Situation   Prior Services Intermittent Physical Support for ADL Per Family   Housing / " Facility [facility]   Steps Into Home 3   Bathroom Set up Walk In Shower;Bathtub / Shower Combination   Equipment Owned Front-Wheel Walker   Lives with - Patient's Self Care Capacity Alone and Unable to Care For Self   Comments pt resides in [place] per dtr pt has multiple family members who check in daily and assist w/all IADL's as well as dtr caling and face timing daily   Prior Level of ADL Function   Self Feeding Independent   Grooming / Hygiene Independent   Bathing Independent   Dressing Independent   Toileting Independent   Comments per dtr report pt was completing basic ADL's I'ly no clear confirmation on bathing/toilet tasks   Prior Level of IADL Function   Medication Management Requires Assist   Laundry Requires Assist   Kitchen Mobility Requires Assist   Finances Requires Assist   Home Management Requires Assist   Shopping Requires Assist   Prior Level Of Mobility Independent Without Device in Home   Driving / Transportation Relatives / Others Provide Transportation   History of Falls   History of Falls Yes   Date of Last Fall   (reason for admission)   Precautions   Precautions Fall Risk   Pain 0 - 10 Group   Therapist Pain Assessment During Activity;Nurse Notified   Cognition    Cognition / Consciousness X   Speech/ Communication   (did have a brief moment w/word salad)   Orientation Level Not Oriented to Reason;Not Oriented to Place;Not Oriented to Time;Not Oriented to Day   Level of Consciousness Alert   Ability To Follow Commands 1 Step   Safety Awareness Impaired;Impulsive   New Learning Impaired   Attention Impaired   Comments Cooperative and pleasantly confused, Per dtr underlying dementia w/memory but generally still functional w/day to day tasks; today tending to pull at lines, restless but easily redirectable   Passive ROM Upper Body   Passive ROM Upper Body WDL   Active ROM Upper Body   Active ROM Upper Body  X   Comments mildly limited shoulder ROM   Strength Upper Body   Upper Body  Strength  WDL   Neurological Concerns   Neurological Concerns No   Coordination Upper Body   Coordination X   Comments gross and fine motor delay in BUE appears to be more so d/t cognition   Balance Assessment   Sitting Balance (Static) Fair -   Sitting Balance (Dynamic) Fair -   Standing Balance (Static) Poor +   Standing Balance (Dynamic) Poor   Weight Shift Sitting Fair   Weight Shift Standing Poor   Comments HHA   Bed Mobility    Supine to Sit Minimal Assist   ADL Assessment   Grooming Minimal Assist;Seated   Upper Body Dressing Minimal Assist   Lower Body Dressing Maximal Assist   Toileting Total Assist   Comments severe bowel incontience   How much help from another person does the patient currently need...   6 Clicks Daily Activity Score 15   Functional Mobility   Sit to Stand Minimal Assist   Bed, Chair, Wheelchair Transfer Minimal Assist   Mobility EOB>chair   Visual Perception   Visual Perception  Not Tested   Edema / Skin Assessment   Edema / Skin  X   Comments multiple bruises and abraisons in various stages of healing   Activity Tolerance   Comments no c/o pain or fatigue   Patient / Family Goals   Patient / Family Goal #1 to go home   Short Term Goals   Short Term Goal # 1 pt will complete FB dressing w/spv   Short Term Goal # 2 pt will complete toilet txf w/spv   Short Term Goal # 3 pt will complete grooming standing at sink w/spv   Education Group   Role of Occupational Therapist Patient Response Patient;Acceptance;No Learning Evidence;Family;Explanation;Demonstration   Occupational Therapy Initial Treatment Plan    Treatment Interventions Self Care / Activities of Daily Living;Adaptive Equipment;Cognitive Skill Development;Community Re-Integration;Manual Therapy Techniques;Neuro Re-Education / Balance;Therapeutic Exercises;Therapeutic Activity   Treatment Frequency 4 Times per Week   Duration Until Therapy Goals Met   Problem List   Problem List Decreased Active Daily Living Skills;Decreased  Functional Mobility;Decreased Activity Tolerance;Safety Awareness Deficits / Cognition;Impaired Posture / Trunk Alignment;Impaired Coordination Right Upper Extremity;Impaired Coordination Left Upper Extremity;Impaired Cognitive Function;Impaired Postural Control / Balance   Anticipated Discharge Equipment and Recommendations   DC Equipment Recommendations Unable to determine at this time   Discharge Recommendations Recommend post-acute placement for additional occupational therapy services prior to discharge home   Interdisciplinary Plan of Care Collaboration   IDT Collaboration with  Nursing;Physical Therapist   Patient Position at End of Therapy Call Light within Reach;Tray Table within Reach;Phone within Reach;Seated;Chair Alarm On   Collaboration Comments RN aware of OT eval and pts efforts   Session Information   Date / Session Number  11/29 #1 (1/4, 12/5)

## 2023-11-30 PROBLEM — Z51.5 COMFORT MEASURES ONLY STATUS: Status: ACTIVE | Noted: 2023-01-01

## 2023-11-30 NOTE — PROGRESS NOTES
Rapid RN at bedside for bedside RN concern, HR up to 140's, increased in agitation, 's (normally 140's per flowsheets). CHANEL Lanier at bedside.   250mL NS bolus ordered per APRN, given with improved BP and 's. EKG ordered with one time dose metoprolol 2.5mg IV and continuous telemetry monitoring per APRN.  1.25mg given IV push Metoprolol given, SBPs 90's  0237 Additional 500mL bolus ordered per CHANEL Lanier

## 2023-11-30 NOTE — ASSESSMENT & PLAN NOTE
Patient lives by herself and according to the daughter patient has short memory deficit, advanced  Patient came with delirium and she is alert to herself only  Patient is on high risk for worsening delirium  Continue nonpharmacological treatment  Small dose of Seroquel 12.5 mg at night as needed  DNR/DNI assess the patient daily and consider hospice

## 2023-11-30 NOTE — PROGRESS NOTES
Late Entry for 0145: Pt. restless and incoherent- unable to take tylenol PO or follow commands and attempting to get out of bed. Pt. HR elevated to 143 sustaining tachycardia and SBP dropped to 90s with temperature of 97.7 and RR of 18- RICU rapid response rounded with Yolande Dove and assisted in giving patient bolus of NS and metoprolol per orders. BS: 83 -thickened juice given and EKG performed and telemetry monitor ordered and in place. Four bed rails put in place and hand mitts removed as they were agitating the patient. Currently pt. is sleeping with HR of 101 and BP of 110/55. Will continue to monitor.

## 2023-11-30 NOTE — THERAPY
Physical Therapy Contact Note    Patient Name: Mercedes Rollins  Age:  89 y.o., Sex:  female  Medical Record #: [id number]  Today's Date: 11/30/2023    Attempted to see for follow up PT session. Per EMR, pt transitioned to comfort care. PT will sign off at this time. Please re-consult if goals of care change.     Amanda Faye, PT, DPT

## 2023-11-30 NOTE — CARE PLAN
The patient is Stable - Low risk of patient condition declining or worsening    Shift Goals  Clinical Goals: stable neuro/wound care and maintain skin integrity  Patient Goals: yue  Family Goals: yue    Progress made toward(s) clinical / shift goals:    Problem: Knowledge Deficit - Standard  Goal: Patient and family/care givers will demonstrate understanding of plan of care, disease process/condition, diagnostic tests and medications  11/30/2023 0622 by Paulette Akhtar R.N.  Outcome: Progressing  11/30/2023 0422 by Paulette Akhtar R.N.  Outcome: Progressing     Problem: Fall Risk  Goal: Patient will remain free from falls  11/30/2023 0622 by Paulette Akhtar R.N.  Outcome: Progressing  11/30/2023 0422 by MUKESH NovaN.  Outcome: Progressing     Problem: Pain - Standard  Goal: Alleviation of pain or a reduction in pain to the patient’s comfort goal  11/30/2023 0622 by Paulette Akhtar R.N.  Outcome: Progressing  11/30/2023 0422 by MUKESH NovaN.  Outcome: Progressing     Problem: Skin Integrity  Goal: Skin integrity is maintained or improved  Outcome: Progressing       Patient is not progressing towards the following goals:

## 2023-11-30 NOTE — CARE PLAN
Problem: Knowledge Deficit - Standard  Goal: Patient and family/care givers will demonstrate understanding of plan of care, disease process/condition, diagnostic tests and medications  Outcome: Not Progressing     Problem: Fall Risk  Goal: Patient will remain free from falls  Outcome: Progressing     Problem: Pain - Standard  Goal: Alleviation of pain or a reduction in pain to the patient’s comfort goal  Outcome: Progressing     Problem: Skin Integrity  Goal: Skin integrity is maintained or improved  Outcome: Progressing   The patient is Watcher - Medium risk of patient condition declining or worsening    Shift Goals  Clinical Goals: pain management, safety  Patient Goals: yue  Family Goals: yue    Progress made toward(s) clinical / shift goals:  medicated for pain. Fall precautions in place.    Patient is not progressing towards the following goals:      Problem: Knowledge Deficit - Standard  Goal: Patient and family/care givers will demonstrate understanding of plan of care, disease process/condition, diagnostic tests and medications  Outcome: Not Progressing

## 2023-11-30 NOTE — WOUND TEAM
"[facility]  Inpatient Services  Initial Wound and Skin Care Evaluation    Admission Date: 11/28/2023     Last order of IP CONSULT TO WOUND CARE was found on 11/29/2023 from Hospital Encounter on 11/28/2023     HPI, PMH, SH: Reviewed    No past surgical history on file.  Social History     Tobacco Use    Smoking status: Not on file    Smokeless tobacco: Not on file   Substance Use Topics    Alcohol use: Not on file     No chief complaint on file.    Diagnosis: Trauma [T14.90XA]    Unit where seen by Wound Team: T910-1    WOUND CONSULT RELATED TO:  L head, buttocks, perineum    WOUND TEAM PLAN OF CARE - Frequency of Follow-up:   Nursing to follow dressing orders written for wound care. Contact wound team if area fails to progress, deteriorates or with any questions/concerns if something comes up before next scheduled follow up (See below as to whether wound is following and frequency of wound follow up)   Not following, consult as needed  - L head, buttocks, perineum    WOUND HISTORY:   Per trauma note \"The patient is a 89  year old  female who was found down at home by family.  It is unknown when she fell.  The patient found with diffuse bruises and covered in stool. \"       WOUND ASSESSMENT/LDA  Wound 11/28/23 Skin Tear Hand Dorsal Left (Active)   Date First Assessed: 11/28/23   Present on Original Admission: Yes  Primary Wound Type: Skin Tear  Location: Hand  Wound Orientation: Dorsal  Laterality: Left                      Assessments 11/29/2023  2:00 PM   Site Assessment Dry;Red;Scabbed   Periwound Assessment Purple;Ecchymosis   Margins Defined edges;Attached edges   Closure Open to air   Drainage Amount None   Wound Cleansing Normal Saline Irrigation   Periwound Protectant Mepitel   Dressing Status Intact   Dressing Changed Observed   Dressing Cleansing/Solutions Not Applicable   Dressing Options Mepitel One   Dressing Change/Treatment Frequency Weekly, and As Needed   NEXT Dressing " Change/Treatment Date 12/05/23   NEXT Weekly Photo (Inpatient Only) 12/05/23   Wound Team Following Not following   Non-staged Wound Description Partial thickness   Wound Length (cm) 3.5 cm   Wound Width (cm) 3 cm   Wound Surface Area (cm^2) 10.5 cm^2   Shape oval   Exposed Structures None       Wound 11/28/23 Laceration Forehead Left (Active)   Date First Assessed: 11/28/23   Present on Original Admission: Yes  Primary Wound Type: Laceration  Location: Forehead  Laterality: Left             Assessments 11/29/2023  2:00 PM   Site Assessment Red;Brown   Periwound Assessment Ecchymosis   Margins Defined edges   Closure Open to air   Drainage Amount Scant   Drainage Description Sanguineous   Treatments Cleansed;Nonselective debridement   Wound Cleansing Normal Saline Irrigation   Periwound Protectant Antibiotic Ointment   Dressing Status Intact   Dressing Changed New   Dressing Cleansing/Solutions Not Applicable   Dressing Options Mepitel One;Silicone Adhesive Foam   Dressing Change/Treatment Frequency Every Shift, and As Needed   NEXT Dressing Change/Treatment Date 11/29/23   NEXT Weekly Photo (Inpatient Only) 12/05/23   Wound Team Following Not following   Non-staged Wound Description Full thickness   Wound Length (cm) 3 cm   Wound Width (cm) 0.8 cm   Wound Surface Area (cm^2) 2.4 cm^2   Shape linear   Exposed Structures DONNA        Vascular:    JUANCARLOS:   No results found.    Lab Values:    Lab Results   Component Value Date/Time    WBC 14.4 (H) 11/29/2023 04:05 AM    RBC 2.85 (L) 11/29/2023 04:05 AM    HEMOGLOBIN 8.3 (L) 11/29/2023 04:05 AM    HEMATOCRIT 24.4 (L) 11/29/2023 04:05 AM         Culture Results show:  No results found for this or any previous visit (from the past 720 hour(s)).    Pain Level/Medicated:  None, Tolerated without pain medication       INTERVENTIONS BY WOUND TEAM:  Chart and images reviewed. Discussed with bedside RN. All areas of concern (based on picture review, LDA review and discussion with  bedside RN) have been thoroughly assessed. Documentation of areas based on significant findings. This RN in to assess patient. Performed standard wound care which includes appropriate positioning, dressing removal and non-selective debridement. Pictures and measurements obtained weekly if/when required.    Wound:  L hand  Preparation for Dressing removal: Removed without difficulty  Cleansed/Non-selectively Debrided with:  Normal Saline and Gauze  Vanessa wound: Cleansed with Normal Saline and Gauze, Prepped with N/A  Primary Dressing:  Mepitel one     Wound:  L forehead  Preparation for Dressing removal: Open to air  Cleansed/Non-selectively Debrided with:  Normal Saline and Gauze  Vanessa wound: Cleansed with Normal Saline and Gauze, Prepped with N/A  Primary Dressing:  mepitel one  Secondary (Outer) Dressing: part of adhesive foam    Advanced Wound Care Discharge Planning  Number of Clinicians necessary to complete wound care: 1  Is patient requiring IV pain medications for dressing changes:  No   Length of time for dressing change 35 min. (This does not include chart review, pre-medication time, set up, clean up or time spent charting.)    Interdisciplinary consultation: Patient, Bedside RN (Estrada)    EVALUATION / RATIONALE FOR TREATMENT:     Date:  11/29/23  Wound Status:  Initial evaluation    Pt had fall and was down for unknown length of time. She has laceration to L forehead. Ecchymosis present to face, nose, around eyes and L ear. There is ecchymosis to L shoulder posterior, L lateral hip/thigh and leg (42 x 14 cm). Posterior lateral  RLE with scab that is almost ready to autolytic debride. Posterior knees and thighs with multiple circular/oval ecchymosis. Heels are intact. L lower back with more ecchymosis. Coccyx with 1.5 x 2 cm light purplish discoloration, soft pliable with no c/o pain. Unable to determine if pressure injury or more ecchymosis. Sacral offloading drsg was in place but pt having multiple  episodes of incontinence of bowel requiring removal.  Buttocks ( L-5 x 6 cm, R-10 x 6.5 cm), L flank, LUE also with ecchymosis. With fall and being found down with multiple areas of discoloration that don't yudy unable to rule out pressure injuries. If DTI/pressure injuries are present it may take several days to reveal.         Goals: Steady decrease in wound area and depth weekly.    NURSING PLAN OF CARE ORDERS:  Dressing changes: See Dressing Care orders  RN Prevention Protocol    NUTRITION RECOMMENDATIONS   Wound Team Recommendations:  N/A    DIET ORDERS (From admission to next 24h)       Start     Ordered    11/29/23 1529  Supplements  ALL MEALS        Comments: Ensure Compact TID, Magic cups with dinner.   Question:  Which Supplement  Answer:  OTHER (see comments)    11/29/23 1528    11/29/23 0854  Diet Order Diet: Level 6 - Soft and Bite Sized; Liquid level: Level 0 - Thin; Tray Modifications (optional): SLP - 1:1 Supervision by Nursing, No Straws  ALL MEALS        Question Answer Comment   Diet: Level 6 - Soft and Bite Sized    Liquid level Level 0 - Thin    Tray Modifications (optional) SLP - 1:1 Supervision by Nursing    Tray Modifications (optional) No Straws        11/29/23 0854                    PREVENTATIVE INTERVENTIONS:    Q shift Atul - performed per nursing policy  Q shift pressure point assessments - performed per nursing policy    Surface/Positioning  ICU Low Airloss - Currently in Place  Reposition q 2 hours - Currently in Place  TAPs Turning system - Currently in Place    Offloading/Redistribution  Sacral offloading dressing (Silicone dressing) - Currently in Place  Float Heels off Bed with Pillows - Currently in Place           Respiratory  N/A    Containment/Moisture Prevention    Dri-milton pad - Currently in Place  Naqvi Catheter - Currently in Place  Barrier wipes - Currently in Place  Barrier paste - Currently in Place    Anticipated discharge plans:  TBD        Vac Discharge  Needs:  Vac Discharge plan is purely a recommendation from wound team and not a requirement for discharge unless otherwise stated by physician.  Not Applicable Pt not on a wound vac

## 2023-11-30 NOTE — ASSESSMENT & PLAN NOTE
Patient had multiple bruises on her body  Likely related to recurrent falls, patient was by herself  PT and OT

## 2023-11-30 NOTE — ASSESSMENT & PLAN NOTE
Recurrent falls and with the bruises on her body for   subarachnoid hemorrhage  PT and OT  DNR/DNI

## 2023-11-30 NOTE — PROGRESS NOTES
4 Eyes Skin Assessment Completed by GLENNA Caldwell and GLENNA Fritz.    Head Bruising, Scratch, Swelling, Redness, and Edema L. sided forehead bloody laceration and L. eye bruise  Ears WDL  Nose WDL  Mouth WDL  Neck Bruising  Breast/Chest Bruising  Shoulder Blades WDL  Spine Bruising  (R) Arm/Elbow/Hand Bruising  (L) Arm/Elbow/Hand Bruising and Abrasion L. Hand skin tear  Abdomen Bruising  Groin WDL  Scrotum/Coccyx/Buttocks Discoloration-bruising  (R) Leg Scab and Bruising  (L) Leg Bruising  (R) Heel/Foot/Toe WDL   (L) Heel/Foot/Toe WDL          Devices In Places Naqvi      Interventions In Place Heel Mepilex, Sacral Mepilex, Heel Float Boots, TAP System, Pillows, Elbow Mepilex, Q2 Turns, and Barrier Cream    Possible Skin Injury Yes    Pictures Uploaded Into Epic Yes  Wound Consult Placed Yes  RN Wound Prevention Protocol Ordered Yes

## 2023-11-30 NOTE — CONSULTS
Geriatric Medicine Consultation  Date of Service 11/29/2023    Referring Physician  Darnell Membreno M.D.    Consulting Physician  Travis Chappell M.D.    Reason for Consultation  Dementia    History of Presenting Illness    89-year-old female with history of dementia, dyslipidemia, hypothyroidism, depression and arrhythmia, SVT presented 11/28 to the hospital after fall.  Patient lives by herself and Olivier in her house, patient was found on the floor with multiple bruises and covered in stool, patient has been oriented to herself only, not able to give a good history.  Patient was admitted to trauma service, CT scan for head showed subarachnoid hemorrhage and intracranial hemorrhage, neurosurgery was consulted and no intervention needed at this time.    Patient was found to have elevated white blood cell, CT scan for abdomen did not show any fistula however showed colitis, blood culture and C. difficile test are pending.    Discussed the case with her power of , daughter, discussed all labs and images, daughter agreed for DNR/DNI and agreed for no aggressive treatment, evaluate the patient daily for possible comfort care.    Review of Systems  Review of Systems   Unable to perform ROS: Dementia       Past Medical History  Dementia dyslipidemia with hypothyroidism    Surgical History  Hernia    Family History  Not able to provide family history due to dementia    Social History       Living situation -   Marital status -   Primary caregiver -   Educational level -  Transportation -  POLST                   Yes  Health care POA   Yes   Financial POA       Yes     Medications  Prior to Admission Medications   Prescriptions Last Dose Informant Patient Reported? Taking?   atorvastatin (LIPITOR) 40 MG Tab unknown at unknown  Yes Yes   Sig: Take 40 mg by mouth every day.   buPROPion (WELLBUTRIN XL) 150 MG XL tablet unknown at unknown  Yes Yes   Sig: Take 150 mg by mouth every morning.   clopidogrel (PLAVIX) 75  MG Tab unknown at unknown  Yes Yes   Sig: Take 75 mg by mouth every day.   levothyroxine (SYNTHROID) 50 MCG Tab unknown at unknown  Yes Yes   Sig: Take 50 mcg by mouth every morning on an empty stomach.   metoprolol SR (TOPROL XL) 50 MG TABLET SR 24 HR unknown at unknown  Yes Yes   Sig: Take 50 mg by mouth every day.   traMADol (ULTRAM) 50 MG Tab unknown at unknown  Yes Yes   Sig: Take 50 mg by mouth every 6 hours as needed (for pain).      Facility-Administered Medications: None       Allergies  No Known Allergies    Geriatric Screening    ADL assistance              Yes  IADL assistance             Yes  Cognitive Impairment    Yes  Mood disorder                No   Polypharmacy                Yes  Vision impairment         Yes  Hearing impairment      Yes  Fall                                  Yes  Assistive device            Yes  Urinary incontinence    Yes  Nutrition issues            Yes  Food Insecurity            No   Pressure ulcer              No     Physical Exam  Pulse:  [73-96] 90  Resp:  [15-35] 17  BP: ()/(47-81) 122/58  SpO2:  [87 %-100 %] 94 %  Physical Exam  Constitutional:       General: She is in acute distress.      Appearance: She is ill-appearing.   Neurological:      Mental Status: She is disoriented.           CAM  Acute onset and fluctuating course   Yes  Inattention                                         Yes  Disorganized thinking                        Yes  Altered level of consciousness          Yes    MiniCOG  Word recall (0-3 points)  Clock draw (0-2 points  Total score (0-5 points)    PHQ-2    Over the past 2 weeks, how often have you been bothered by any of the following problems? Not at all Several days More than half the days Nearly every day   Little interest or pleasure in doing things? 0 1 2 3          Feeling down, depressed, or hopeless? 0  1  2  3   Total point score: ____ ____ + ____ + ____ + ____         Laboratory  Recent Labs     11/28/23  1151 11/29/23  0405   WBC  18.7* 14.4*   RBC 3.44* 2.85*   HEMOGLOBIN 10.0* 8.3*   HEMATOCRIT 31.2* 24.4*   MCV 90.7 85.6   MCH 29.1 29.1   MCHC 32.1* 34.0   RDW 53.6* 51.4*   PLATELETCT 233 263   MPV 9.6 9.7     Recent Labs     11/28/23  1151 11/28/23  1929 11/29/23  0405   SODIUM 134* 133* 136   POTASSIUM 4.8 4.7 4.4   CHLORIDE 99 101 106   CO2 18* 18* 19*   GLUCOSE 107* 72 98   BUN 49* 49* 51*   CREATININE 2.07* 1.82* 1.74*   CALCIUM 9.5 8.6 8.2*     Recent Labs     11/28/23  1151   APTT 33.6   INR 1.25*               Imaging  CT-ABDOMEN-PELVIS WITH   Final Result   Addendum (preliminary) 1 of 1   Impression point 6 should state that there is a distended gallbladder with    high-density layering material.      Final      1.  No definite vaginal contrast to suggest rectovaginal fistula.   2.  Wall thickening of the colon extending from the distal transverse colon to the rectum, most consistent with colitis.   3.  Moderate hiatal hernia.   4.  Periportal edema, nonspecific.   5.  Features of chronic pancreatitis.   6.  Distended with high density layering material. Cholelithiasis versus vicarious excretion of contrast from previous exam.      CT-HEAD W/O   Final Result      1. Hemispheric right subdural hematoma measuring 6 mm in maximal thickness, slightly more conspicuous.   2. Increased multifocal bilateral subarachnoid hemorrhage.   3. Stable right temporal hemorrhagic contusion.         DX-PELVIS-1 OR 2 VIEWS   Final Result      No acute osseous abnormality.      DX-HAND 3+ LEFT   Final Result      Degenerative changes without acute fracture or dislocation. If pain persists, repeat radiographs in 7-10 days is recommended.      DX-CHEST-LIMITED (1 VIEW)   Final Result      No acute cardiopulmonary abnormality.      OUTSIDE IMAGES-CT CHEST/ABDOMEN/PELVIS   Final Result      OUTSIDE IMAGES-CT HEAD   Final Result      OUTSIDE IMAGES-CT CERVICAL SPINE   Final Result      OUTSIDE IMAGES-CT FACE   Final Result          Assessment/Plan  *  Trauma- (present on admission)  Assessment & Plan  Recurrent falls and with the bruises on her body for   subarachnoid hemorrhage  PT and OT  DNR/DNI    Acute kidney injury (HCC)- (present on admission)  Assessment & Plan  Came with creatinine 2.07, unknown her baseline  Likely prerenal with diarrhea  IV fluid, improving  Avoid nephrotoxic medication  CT scan did not show hydronephrosis  Labs daily    Dementia (HCC)- (present on admission)  Assessment & Plan  Patient lives by herself and according to the daughter patient has short memory deficit, advanced  Patient came with delirium and she is alert to herself only  Patient is on high risk for worsening delirium  Continue nonpharmacological treatment  Small dose of Seroquel 12.5 mg at night as needed  DNR/DNI assess the patient daily and consider hospice      Colitis  Assessment & Plan  CT scan showed colitis  Patient has leukocytosis  Came with diarrhea  Start with Flagyl, rule out C. Difficile  Blood culture  Labs daily    Urinary retention- (present on admission)  Assessment & Plan  Naqvi in place  Follow-up with voiding trial    Multiple contusions- (present on admission)  Assessment & Plan  Patient had multiple bruises on her body  Likely related to recurrent falls, patient was by herself  PT and OT      Compression fracture of T5 vertebra (HCC)- (present on admission)  Assessment & Plan  Pain management    Depression- (present on admission)  Assessment & Plan  Continue bupropion    Dyslipidemia- (present on admission)  Assessment & Plan  Holding statin at this time    Intracranial hemorrhage, subarachnoid (HCC)- (present on admission)  Assessment & Plan  Followed in neurosurgery  Daughter agreed for no aggressive treatment    ACP (advance care planning)- (present on admission)  Assessment & Plan  On 11/29, the plan of care was discussed with her daughter who is the power of , answered all her question, discussed the poor prognosis giving the age and  dementia, the daughter agreed for DNR/DNI agreed to assess the patient daily and possible hospice if there is no improving.  Time 30 minutes           Interventions to be considered in all patients in order to minimize the risk of delirium.   -do not disturb patient (vitals or lab draws) between the hours of 10 PM and 6 AM.  -ideally the patient should not sleep during the day and we should avoid day time naps.   -up in chair for meals  -ambulate at least three times daily, as able  -watch for constipation  -timed voiding - ask patient is she would like to go to the bathroom q 2-3 hours, except during the do not disturb hours.   -remove all necessary lines (central lines, peripheral IVs, feeding tubes, lopez catheters)  -unless patient has shown harm to self or others I would recommend against use of restraints - either chemical or physical (antipsychotics)   -minimize polypharmacy, do not dose medication during sleep hours

## 2023-11-30 NOTE — PROGRESS NOTES
Notified by nursing of patient's HR in 140s.  Patient evaluated. Rapid Response team already at bedside.    Patient pleasantly confused. Somewhat restless but follows commands.  ECG with sinus tachycardia. SBP decreased to 90s. NS bolus started with improvement in SBP. 1.25 Metoprolol given with HR back to 92.   Mittens removed with decrease in agitation.    Please complete NS bolus of 500 ml.  Continuous cardiac monitoring ordered. Full set of VS Q 4 hrs.  Repeat blood glucose at 0400 with AM labs.  Aspiration precautions. SLP recommending thick liquids. Patient unable to take safely at this time. Hold PO intake until patient more awake.  Please try to avoid use of restraints if possible.     Appreciate Rapid Response Team rounding and assistance.    Please notify Trauma APRN with any further concerns.

## 2023-11-30 NOTE — PROGRESS NOTES
Trauma / Surgical Daily Progress Note    Date of Service  11/30/2023    Chief Complaint  89 y.o. female admitted 11/28/2023 with Traumatic intracranial hemorrhage after an unwitnessed ground level fall on antiplatelet.     Interval Events  Overnight events reviewed  Labs noted  Family requesting pt be transitioned to comfort care   Hospice consult placed     Review of Systems  Review of Systems   Unable to perform ROS: Mental acuity      Vital Signs  Temp:  [36.3 °C (97.3 °F)-36.7 °C (98.1 °F)] 36.3 °C (97.3 °F)  Pulse:  [] 142  Resp:  [14-41] 18  BP: ()/(49-90) 171/87  SpO2:  [91 %-100 %] 96 %    Physical Exam  Physical Exam  Vitals and nursing note reviewed.   Constitutional:       Comments: Frail, elderly   HENT:      Head:      Comments: Large left forehead contusion/scab   Traumatic ecchymosis extending to neck   Pulmonary:      Effort: No respiratory distress.   Abdominal:      Palpations: Abdomen is soft.      Tenderness: There is no abdominal tenderness.   Skin:     Comments: Multiple skin tears, abrasions and areas of traumatic ecchymosis    Neurological:      Mental Status: She is disoriented.     Assessment/Plan  * Trauma- (present on admission)  Assessment & Plan  Unwitnessed ground level fall on clopidogrel and aspirin   Trauma Yellow Transfer Activation from [facility] in [place], [place].  Darnell Membreno MD. Trauma Surgery.    Intracranial hemorrhage, subarachnoid (HCC)- (present on admission)  Assessment & Plan  Outside imaging with left frontal, left occipital and right parietal subarachnoid and intraparenchymal hemorrhages. Additional small fluid collections of intraparenchymal and subarachnoid blood near frontal vertices.  No midline shift, hydrocephalus, or extra-axial fluid collections.   mBIG 3   Non-operative management.  Interval head CT with hemispheric right subdural hematoma measuring 6 mm in maximal thickness, slightly more conspicuous. Increased multifocal bilateral  subarachnoid hemorrhage.  Interval head CT reviewed by Neurosurgery.  Post traumatic pharmacologic seizure prophylaxis for 1 week.  Giovanny Bergeron MD, PhD. Neurosurgeon. [facility]. (Signed off 11/29)    Encounter for current long term use of antiplatelet drug- (present on admission)  Assessment & Plan  Clopidogrel and aspirin.  Thromboelastogram with 93.4 % AA inhibition and 98.5 % ADP inhibtion.  Transfused 1 unit of platelets.  Repeat Thromboelastogram with 76.8% AA inhibition and 90.5% ADP inhibition. Given head CT is stable per Neurosurgery, will not transfuse platelets.  Holding maintenance medication during acute traumatic illness.    Acute kidney injury (HCC)- (present on admission)  Assessment & Plan  Referring facility BUN 48.0 and creatinine 2.25.  Received 1 liter LR bolus prior to arrival.  Naqvi catheter placement ICU.   IV hydration and trend laboratory studies.   11/29 Repeat BMP with improved renal indices.   - MIVF decreased.  - 250 cc bolus post contrast study    Arrhythmia- (present on admission)  Assessment & Plan  History of SVT.  Chronic condition treated with metoprolol .  Holding maintenance medication during acute traumatic illness.    Contraindication to deep vein thrombosis (DVT) prophylaxis- (present on admission)  Assessment & Plan  VTE prophylaxis initially contraindicated secondary to elevated bleeding risk.  11/30 Trauma surveillance venous duplex ultrasonography ordered.    Urinary retention- (present on admission)  Assessment & Plan  11/28 Naqvi in place.  Consider removing in 48 hours    ACP (advance care planning)- (present on admission)  Assessment & Plan  11/29 The patient is 75 years old or older and a geriatrics consult is indicated  Travis Chappell MD, Geriatric Hospitalist.    Discharge planning issues- (present on admission)  Assessment & Plan  Date of admission: 11/28/2023.  11/29 Transfer orders from SICU.  11/29 SNF referral.  Cleared for discharge:  No.  Discharge delayed: No.  Discharge date: tbd.    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  - CT abdomen pelvis with rectal/IV contrast with no fistula.    Advance care planning- (present on admission)  Assessment & Plan  Advance directive scanned into chart.  DNR/DNI.  11/29 Palliative care consult.    Compression fracture of T5 vertebra (HCC)- (present on admission)  Assessment & Plan  Age indeterminate T5 fracture.     Hiatal hernia- (present on admission)  Assessment & Plan  Referring facility imaging with large hiatal hernia.    Dementia (HCC)- (present on admission)  Assessment & Plan  Confused at baseline per family report.  Best neuro is A/O x 2-3.    Depression- (present on admission)  Assessment & Plan  Chronic condition treated with bupropion  Resumed maintenance medication on admission.    Dyslipidemia- (present on admission)  Assessment & Plan  Chronic condition treated with atorvastatin  Resumed maintenance medication on admission.    Hypothyroid- (present on admission)  Assessment & Plan  Chronic condition treated with levothyroxine   Resumed maintenance medication on admission.      Discussed patient condition with RN and Dr. Membreno .

## 2023-11-30 NOTE — ASSESSMENT & PLAN NOTE
CT scan showed colitis  Patient has leukocytosis  Came with diarrhea  Start with Flagyl, rule out C. Difficile  Blood culture  Labs daily

## 2023-11-30 NOTE — ASSESSMENT & PLAN NOTE
Came with creatinine 2.07, unknown her baseline  Likely prerenal with diarrhea  IV fluid, improving  Avoid nephrotoxic medication  CT scan did not show hydronephrosis  Labs daily

## 2023-11-30 NOTE — THERAPY
Speech Language Therapy Contact Note    Patient Name: Mercedes Rollins  Age:  89 y.o., Sex:  female  Medical Record #: [id number]  Today's Date: 11/30/2023    Discussed missed therapy with RN.        11/30/23 1338   Treatment Variance   Reason For Missed Therapy Medical - Other (Please Comment)   Initial Contact Note    Initial Contact Note  Order Received and Verified. Speech Therapy Evaluation NOT Completed Because Patient Does Not Require Acute Speech Therapy at this Time.  (pt as transitioned to comfort care)   Interdisciplinary Plan of Care Collaboration   IDT Collaboration with  Nursing;Other (See Comments)  (EMR)   Collaboration Comments Per RN/EMR, pt as transitioned to comfort care. SLP to sign-off at this time. Please contact SLP with any questions. Thank you.

## 2023-11-30 NOTE — DOCUMENTATION QUERY
[facility]                                                                       Query Response Note      PATIENT:               VALORIE LOAIZA  ACCT #:                  [id number]  MRN:                     [id number]  :                      1932  ADMIT DATE:       2023 11:39 AM  DISCH DATE:          RESPONDING  PROVIDER #:        [id number]           QUERY TEXT:    Please clarify the clinical relevance or significance for the clinical/diagnostic findings below:     CT head:  Grossly stable hemorrhagic contusion in the right temporal lobe measuring about 1.7 cm with mild associated edema        The patient's clinical indicators include:  Clinical Findings:     -- Confused, at baseline with dementia  -- CT head: Grossly stable hemorrhagic contusion in the right temporal lobe measuring about 1.7 cm with mild associated edema; No midline shift or hydrocephalus.        Risk Factors: Unwitnessed GLF, scattered intracranial traumatic SAH, R SDH, temporal contusion, Plavix use, advanced age    Treatments: NSG consult, serial imaging, seizure prophylaxes x 1 week, Plt transfusion      Contact me with any questions.    Thank you for your time and attention,  Tammy Jones RN, CDI  [email or web address]  Connect via email, Voalte or messenger.  Options provided:   -- Cerebral edema is clinically relevant and ruled in   -- Findings of no clinical significance   -- Other explanation, (please specify the other explanation)   -- Unable to determine      Query created by: Tammy Jones on 2023 8:42 AM    RESPONSE TEXT:    Unable to determine          Electronically signed by:  AGNES RANDLE MD 2023 5:58 PM

## 2023-11-30 NOTE — PROGRESS NOTES
Geriatric Medicine Daily Progress Note      Date of Service  11/30/2023    Chief Complaint  89 y.o. female admitted 11/28/2023 with fall    Hospital Course    89-year-old female with history of dementia, dyslipidemia, hypothyroidism, depression and arrhythmia, SVT presented 11/28 to the hospital after fall.  Patient lives by herself and Olivier in her house, patient was found on the floor with multiple bruises and covered in stool, patient has been oriented to herself only, not able to give a good history.  Patient was admitted to trauma service, CT scan for head showed subarachnoid hemorrhage and intracranial hemorrhage, neurosurgery was consulted and no intervention needed at this time.     Patient was found to have elevated white blood cell, CT scan for abdomen did not show any fistula however showed colitis, blood culture and C. difficile test are pending.     Discussed the case with her power of , daughter, discussed all labs and images, daughter agreed for DNR/DNI, the plan of care also was discussed again on 11/30 with the patient's daughter and son, they agreed to start with comfort care with no any aggressive treatment and avoid any antibiotics lab or images.      Interval Problem Update  -Evaluated examined the patient at bedside, patient is very confused with agitation, multiple bruises.  -Worsening leukopenia and procalcitonin.  -EKG reviewed personally  -The goal of care was discussed in detail with the patient's son and daughter, answered all their question, reviewed all images and labs, they understand the poor prognosis of advanced dementia and recurrent falls, they agreed to avoid any aggressive treatment and start with comfort care.  -Start with IV morphine and adjust the medication if needed  -IM Haldol and continue Seroquel        Code Status  Comfort care    Disposition  Home with hospice, continue comfort care    Review of Systems  Review of Systems   Unable to perform ROS: Dementia         Physical Exam  Temp:  [36.3 °C (97.3 °F)-36.7 °C (98.1 °F)] 36.6 °C (97.9 °F)  Pulse:  [] 109  Resp:  [16-29] 18  BP: ()/() 134/108  SpO2:  [91 %-97 %] 95 %    Physical Exam  Constitutional:       General: She is in acute distress.      Appearance: She is well-developed. She is ill-appearing.   Neck:      Vascular: No JVD.   Cardiovascular:      Rate and Rhythm: Tachycardia present.      Heart sounds: No murmur heard.  Pulmonary:      Effort: Pulmonary effort is normal.      Breath sounds: Normal breath sounds. No wheezing or rales.   Abdominal:      General: There is no distension.      Palpations: Abdomen is soft.      Tenderness: There is no abdominal tenderness. There is no guarding or rebound.   Musculoskeletal:         General: Normal range of motion.      Cervical back: Normal range of motion and neck supple.      Right lower leg: No edema.      Left lower leg: No edema.   Skin:     General: Skin is warm and dry.      Findings: Bruising, erythema, lesion and rash present.   Neurological:      Mental Status: She is alert. She is disoriented.      Coordination: Coordination normal.         CAM  Acute onset and fluctuating course   Yes  Inattention                                         Yes  Disorganized thinking                        Yes  Altered level of consciousness          Yes    Medication Review    Yes    Laboratory  Recent Labs     11/28/23  1151 11/29/23  0405 11/30/23  0754   WBC 18.7* 14.4* 3.1*   RBC 3.44* 2.85* 3.07*   HEMOGLOBIN 10.0* 8.3* 8.7*   HEMATOCRIT 31.2* 24.4* 26.4*   MCV 90.7 85.6 86.0   MCH 29.1 29.1 28.3   MCHC 32.1* 34.0 33.0   RDW 53.6* 51.4* 52.8*   PLATELETCT 233 263 237   MPV 9.6 9.7 9.8     Recent Labs     11/28/23  1929 11/29/23  0405 11/30/23  0754   SODIUM 133* 136 141   POTASSIUM 4.7 4.4 3.8   CHLORIDE 101 106 112   CO2 18* 19* 18*   GLUCOSE 72 98 79   BUN 49* 51* 42*   CREATININE 1.82* 1.74* 1.25   CALCIUM 8.6 8.2* 7.6*     Recent Labs      11/28/23  1151   APTT 33.6   INR 1.25*               Imaging  CT-ABDOMEN-PELVIS WITH   Final Result   Addendum (preliminary) 1 of 1   Impression point 6 should state that there is a distended gallbladder with    high-density layering material.      Final      1.  No definite vaginal contrast to suggest rectovaginal fistula.   2.  Wall thickening of the colon extending from the distal transverse colon to the rectum, most consistent with colitis.   3.  Moderate hiatal hernia.   4.  Periportal edema, nonspecific.   5.  Features of chronic pancreatitis.   6.  Distended with high density layering material. Cholelithiasis versus vicarious excretion of contrast from previous exam.      CT-HEAD W/O   Final Result      1. Hemispheric right subdural hematoma measuring 6 mm in maximal thickness, slightly more conspicuous.   2. Increased multifocal bilateral subarachnoid hemorrhage.   3. Stable right temporal hemorrhagic contusion.         DX-PELVIS-1 OR 2 VIEWS   Final Result      No acute osseous abnormality.      DX-HAND 3+ LEFT   Final Result      Degenerative changes without acute fracture or dislocation. If pain persists, repeat radiographs in 7-10 days is recommended.      DX-CHEST-LIMITED (1 VIEW)   Final Result      No acute cardiopulmonary abnormality.      OUTSIDE IMAGES-CT CHEST/ABDOMEN/PELVIS   Final Result      OUTSIDE IMAGES-CT HEAD   Final Result      OUTSIDE IMAGES-CT CERVICAL SPINE   Final Result      OUTSIDE IMAGES-CT FACE   Final Result           Assessment/Plan  * Trauma- (present on admission)  Assessment & Plan  Recurrent falls and with the bruises on her body for   subarachnoid hemorrhage  Family request neurosurgery  DNR/DNI and comfort care    Acute kidney injury (HCC)- (present on admission)  Assessment & Plan  Came with creatinine 2.07, unknown her baseline  Likely prerenal with diarrhea  IV fluid, improving  Avoid nephrotoxic medication  CT scan did not show hydronephrosis  No labs daily, comfort  care    Dementia (HCC)- (present on admission)  Assessment & Plan  Patient lives by herself and according to the daughter patient has short memory deficit, advanced  Patient came with delirium and she is alert to herself only  Patient is on high risk for worsening delirium  Continue nonpharmacological treatment  Small dose of Seroquel 12.5 mg at night as needed  DNR/DNI and comfort care  Haldol as needed      Colitis  Assessment & Plan  CT scan showed colitis  Patient has leukocytosis  Came with diarrhea  C. difficile is negative  Blood culture negative  Comfort care with no antibiotics    Urinary retention- (present on admission)  Assessment & Plan  Naqvi in place  Follow-up with voiding trial    Multiple contusions- (present on admission)  Assessment & Plan  Patient had multiple bruises on her body  Likely related to recurrent falls, patient was by herself  Comfort care      Compression fracture of T5 vertebra (HCC)- (present on admission)  Assessment & Plan  Pain management    Depression- (present on admission)  Assessment & Plan  Continue bupropion  And continue comfort care    Dyslipidemia- (present on admission)  Assessment & Plan  Comfort care    Intracranial hemorrhage, subarachnoid (HCC)- (present on admission)  Assessment & Plan  Followed in neurosurgery  Daughter agreed for no aggressive treatment    Malnutrition   Significant muscles loss and cachectic appearance  Likely related to advanced dementia  Comfort care      Multiple bruises   Multiple falls  Comfort care      ACP (advance care planning)- (present on admission)  Assessment & Plan  On 11/29, the plan of care was discussed with her daughter who is the power of , answered all her question, discussed the poor prognosis giving the age and dementia, the daughter agreed for DNR/DNI agreed to assess the patient daily and possible hospice if there is no improving.  Time 30 minutes    Long discussion was done with the patient's family daughter  and son, on 11/30, discussed the poor prognosis of advanced pain dementia and recurrent falls, discussed all labs and images, discussed all treatment options including comfort care, the family agreed no aggressive treatment, no labs or images, no antibiotics, start with antibiotics, time 34 minutes.         VTE prophylaxis: SCD  Greater than 51 minutes spent prepping to see patient (e.g. review of tests) obtaining and/or reviewing separately obtained history. Performing a medically appropriate examination and/ evaluation.  Counseling and educating the patient/family/caregiver.  Ordering medications, tests, or procedures.  Referring and communicating with other health care professionals.  Documenting clinical information in EPIC.  Independently interpreting results and communicating results to patient/family/caregiver.  Care coordination

## 2023-11-30 NOTE — PROGRESS NOTES
Monitor summary: -101, CA -0.16, QRS -0.08, QT -0.29, with PACs and PVCs per strip from the monitor room.

## 2023-11-30 NOTE — PROGRESS NOTES
Jules from Lab called with critical result of procalcitonin at 0843. Critical lab result read back to Jules.   Nadia TREJO notified of critical lab result at 0900.  Critical lab result read back by Nadia TREJO.

## 2023-11-30 NOTE — ASSESSMENT & PLAN NOTE
On 11/29, the plan of care was discussed with her daughter who is the power of , answered all her question, discussed the poor prognosis giving the age and dementia, the daughter agreed for DNR/DNI agreed to assess the patient daily and possible hospice if there is no improving.  Time 30 minutes

## 2023-12-01 NOTE — PROGRESS NOTES
Trauma / Surgical Daily Progress Note    Date of Service  12/1/2023    Chief Complaint  89 y.o. female admitted 11/28/2023 with Traumatic intracranial hemorrhage after an unwitnessed ground level fall on antiplatelet.        Interval Events  No interval events  Transition to comfort care  Many family at bedside    Medicine team taking over care. Discussed with Dr. Mcwilliams and appreciate his assistance.     Trauma signed off     Review of Systems  Review of Systems   Unable to perform ROS: Mental status change      Vital Signs  Temp:  [36.6 °C (97.9 °F)] 36.6 °C (97.9 °F)  Pulse:  [] 89  Resp:  [17-18] 17  BP: (127-134)/() 127/78  SpO2:  [91 %-95 %] 91 %    Physical Exam  Physical Exam  Vitals and nursing note reviewed.   Constitutional:       Comments: Frail, elderly   HENT:      Head:      Comments: Large left forehead contusion/scab   Traumatic ecchymosis extending to neck   Pulmonary:      Effort: No respiratory distress.   Abdominal:      Palpations: Abdomen is soft.      Tenderness: There is no abdominal tenderness.   Skin:     Comments: Multiple skin tears, abrasions and areas of traumatic ecchymosis          Assessment/Plan  * Trauma- (present on admission)  Assessment & Plan  Unwitnessed ground level fall on clopidogrel and aspirin   Trauma Yellow Transfer Activation from [facility] in [place], [place].  Darnell Membreno MD. Trauma Surgery.    Comfort measures only status  Assessment & Plan  Transitioned to comfort care.     Intracranial hemorrhage, subarachnoid (HCC)- (present on admission)  Assessment & Plan  Outside imaging with left frontal, left occipital and right parietal subarachnoid and intraparenchymal hemorrhages. Additional small fluid collections of intraparenchymal and subarachnoid blood near frontal vertices.  No midline shift, hydrocephalus, or extra-axial fluid collections.   mBIG 3   Non-operative management.  Interval head CT with hemispheric right subdural hematoma  measuring 6 mm in maximal thickness, slightly more conspicuous. Increased multifocal bilateral subarachnoid hemorrhage.  Interval head CT reviewed by Neurosurgery.  Post traumatic pharmacologic seizure prophylaxis for 1 week.  Giovanny Bergeron MD, PhD. Neurosurgeon. [facility]. (Signed off 11/29)    Encounter for current long term use of antiplatelet drug- (present on admission)  Assessment & Plan  Clopidogrel and aspirin.  Thromboelastogram with 93.4 % AA inhibition and 98.5 % ADP inhibtion.  Transfused 1 unit of platelets.  Repeat Thromboelastogram with 76.8% AA inhibition and 90.5% ADP inhibition. Given head CT is stable per Neurosurgery, will not transfuse platelets.  Holding maintenance medication during acute traumatic illness.    Acute kidney injury (HCC)- (present on admission)  Assessment & Plan  Referring facility BUN 48.0 and creatinine 2.25.  Received 1 liter LR bolus prior to arrival.  Naqvi catheter placement ICU.   IV hydration and trend laboratory studies.   11/29 Repeat BMP with improved renal indices.   - MIVF decreased.  - 250 cc bolus post contrast study    Arrhythmia- (present on admission)  Assessment & Plan  History of SVT.  Chronic condition treated with metoprolol .  Holding maintenance medication during acute traumatic illness.    Contraindication to deep vein thrombosis (DVT) prophylaxis- (present on admission)  Assessment & Plan  VTE prophylaxis initially contraindicated secondary to elevated bleeding risk.  11/30 Trauma surveillance venous duplex ultrasonography ordered.    Urinary retention- (present on admission)  Assessment & Plan  11/28 Naqvi in place.  Consider removing in 48 hours    ACP (advance care planning)- (present on admission)  Assessment & Plan  11/29 The patient is 75 years old or older and a geriatrics consult is indicated  Travis Chappell MD, Geriatric Hospitalist.    Discharge planning issues- (present on admission)  Assessment & Plan  Date of  admission: 11/28/2023.  11/29 Transfer orders from SICU.  11/29 SNF referral.  Cleared for discharge: No.  Discharge delayed: No.  Discharge date: tbd.    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  - CT abdomen pelvis with rectal/IV contrast with no fistula.    Advance care planning- (present on admission)  Assessment & Plan  Advance directive scanned into chart.  DNR/DNI.  11/29 Palliative care consult.    Compression fracture of T5 vertebra (HCC)- (present on admission)  Assessment & Plan  Age indeterminate T5 fracture.     Hiatal hernia- (present on admission)  Assessment & Plan  Referring facility imaging with large hiatal hernia.    Dementia (HCC)- (present on admission)  Assessment & Plan  Confused at baseline per family report.  Best neuro is A/O x 2-3.    Depression- (present on admission)  Assessment & Plan  Chronic condition treated with bupropion  Resumed maintenance medication on admission.    Dyslipidemia- (present on admission)  Assessment & Plan  Chronic condition treated with atorvastatin  Resumed maintenance medication on admission.    Hypothyroid- (present on admission)  Assessment & Plan  Chronic condition treated with levothyroxine   Resumed maintenance medication on admission.    Discussed patient condition with RN and Dr. Membreno .

## 2023-12-01 NOTE — PROGRESS NOTES
Geriatric Medicine Daily Progress Note      Date of Service  12/1/2023    Chief Complaint  89 y.o. female admitted 11/28/2023 with fall    Hospital Course    89-year-old female with history of dementia, dyslipidemia, hypothyroidism, depression and arrhythmia, SVT presented 11/28 to the hospital after fall.  Patient lives by herself and Olivier in her house, patient was found on the floor with multiple bruises and covered in stool, patient has been oriented to herself only, not able to give a good history.  Patient was admitted to trauma service, CT scan for head showed subarachnoid hemorrhage and intracranial hemorrhage, neurosurgery was consulted and no intervention needed at this time.     Patient was found to have elevated white blood cell, CT scan for abdomen did not show any fistula however showed colitis, blood culture and C. difficile test are pending.     Discussed the case with her power of , daughter, discussed all labs and images, daughter agreed for DNR/DNI, the plan of care also was discussed again on 11/30 with the patient's daughter and son, they agreed to start with comfort care with no any aggressive treatment and avoid any antibiotics lab or images.      Interval Problem Update  -Evaluated examined the patient at bedside, sleeping in bed, not cooperative, comfortable.-Family around her.  -Increase morphine and Ativan to every hour, close monitoring and increase medication if needed  -Discussed the plan with the family, they are asked to keep the patient at the hospital.        Code Status  Comfort care    Disposition  Continue comfort care inpatient    Review of Systems  Review of Systems   Unable to perform ROS: Dementia        Physical Exam  Temp:  [36.6 °C (97.9 °F)] 36.6 °C (97.9 °F)  Pulse:  [89] 89  Resp:  [17] 17  BP: (127)/(78) 127/78  SpO2:  [91 %] 91 %    Physical Exam  Constitutional:       General: She is in acute distress.      Appearance: She is well-developed. She is  ill-appearing.   Neck:      Vascular: No JVD.   Cardiovascular:      Rate and Rhythm: Tachycardia present.      Heart sounds: No murmur heard.  Pulmonary:      Effort: Pulmonary effort is normal.      Breath sounds: Normal breath sounds. No wheezing or rales.   Abdominal:      General: There is no distension.      Palpations: Abdomen is soft.      Tenderness: There is no abdominal tenderness. There is no guarding or rebound.   Musculoskeletal:         General: Normal range of motion.      Cervical back: Normal range of motion and neck supple.      Right lower leg: No edema.      Left lower leg: No edema.   Skin:     General: Skin is warm and dry.      Findings: Bruising, erythema, lesion and rash present.   Neurological:      Mental Status: She is alert. She is disoriented.      Coordination: Coordination normal.         CAM  Acute onset and fluctuating course   Yes  Inattention                                         Yes  Disorganized thinking                        Yes  Altered level of consciousness          Yes    Medication Review    Yes    Laboratory  Recent Labs     11/29/23  0405 11/30/23  0754   WBC 14.4* 3.1*   RBC 2.85* 3.07*   HEMOGLOBIN 8.3* 8.7*   HEMATOCRIT 24.4* 26.4*   MCV 85.6 86.0   MCH 29.1 28.3   MCHC 34.0 33.0   RDW 51.4* 52.8*   PLATELETCT 263 237   MPV 9.7 9.8       Recent Labs     11/28/23  1929 11/29/23  0405 11/30/23  0754   SODIUM 133* 136 141   POTASSIUM 4.7 4.4 3.8   CHLORIDE 101 106 112   CO2 18* 19* 18*   GLUCOSE 72 98 79   BUN 49* 51* 42*   CREATININE 1.82* 1.74* 1.25   CALCIUM 8.6 8.2* 7.6*                       Imaging  CT-ABDOMEN-PELVIS WITH   Final Result   Addendum (preliminary) 1 of 1   Impression point 6 should state that there is a distended gallbladder with    high-density layering material.      Final      1.  No definite vaginal contrast to suggest rectovaginal fistula.   2.  Wall thickening of the colon extending from the distal transverse colon to the rectum, most  consistent with colitis.   3.  Moderate hiatal hernia.   4.  Periportal edema, nonspecific.   5.  Features of chronic pancreatitis.   6.  Distended with high density layering material. Cholelithiasis versus vicarious excretion of contrast from previous exam.      CT-HEAD W/O   Final Result      1. Hemispheric right subdural hematoma measuring 6 mm in maximal thickness, slightly more conspicuous.   2. Increased multifocal bilateral subarachnoid hemorrhage.   3. Stable right temporal hemorrhagic contusion.         DX-PELVIS-1 OR 2 VIEWS   Final Result      No acute osseous abnormality.      DX-HAND 3+ LEFT   Final Result      Degenerative changes without acute fracture or dislocation. If pain persists, repeat radiographs in 7-10 days is recommended.      DX-CHEST-LIMITED (1 VIEW)   Final Result      No acute cardiopulmonary abnormality.      OUTSIDE IMAGES-CT CHEST/ABDOMEN/PELVIS   Final Result      OUTSIDE IMAGES-CT HEAD   Final Result      OUTSIDE IMAGES-CT CERVICAL SPINE   Final Result      OUTSIDE IMAGES-CT FACE   Final Result           Assessment/Plan  * Trauma- (present on admission)  Assessment & Plan  Recurrent falls and with the bruises on her body for   subarachnoid hemorrhage  Family request neurosurgery  DNR/DNI and comfort care    Acute kidney injury (HCC)- (present on admission)  Assessment & Plan  Came with creatinine 2.07, unknown her baseline  Likely prerenal with diarrhea  IV fluid, improving  Avoid nephrotoxic medication  CT scan did not show hydronephrosis  No labs daily, comfort care    Dementia (HCC)- (present on admission)  Assessment & Plan  Patient lives by herself and according to the daughter patient has short memory deficit, advanced  Patient came with delirium and she is alert to herself only  Patient is on high risk for worsening delirium  Continue nonpharmacological treatment  Small dose of Seroquel 12.5 mg at night as needed  DNR/DNI and comfort care  Haldol as  needed      Colitis  Assessment & Plan  CT scan showed colitis  Patient has leukocytosis  Came with diarrhea  C. difficile is negative  Blood culture negative  Comfort care with no antibiotics    Urinary retention- (present on admission)  Assessment & Plan  Naqvi in place  Follow-up with voiding trial    Multiple contusions- (present on admission)  Assessment & Plan  Patient had multiple bruises on her body  Likely related to recurrent falls, patient was by herself  Comfort care      Compression fracture of T5 vertebra (HCC)- (present on admission)  Assessment & Plan  Pain management    Depression- (present on admission)  Assessment & Plan  Continue bupropion  And continue comfort care    Dyslipidemia- (present on admission)  Assessment & Plan  Comfort care    Intracranial hemorrhage, subarachnoid (HCC)- (present on admission)  Assessment & Plan  Followed in neurosurgery  Daughter agreed for no aggressive treatment    Malnutrition   Significant muscles loss and cachectic appearance  Likely related to advanced dementia  Comfort care      Multiple bruises   Multiple falls  Comfort care      ACP (advance care planning)- (present on admission)  Assessment & Plan  On 11/29, the plan of care was discussed with her daughter who is the power of , answered all her question, discussed the poor prognosis giving the age and dementia, the daughter agreed for DNR/DNI agreed to assess the patient daily and possible hospice if there is no improving.  Time 30 minutes    Long discussion was done with the patient's family daughter and son, on 11/30, discussed the poor prognosis of advanced pain dementia and recurrent falls, discussed all labs and images, discussed all treatment options including comfort care, the family agreed no aggressive treatment, no labs or images, no antibiotics, start with antibiotics, time 34 minutes.         VTE prophylaxis: SCD

## 2023-12-01 NOTE — DOCUMENTATION QUERY
[facility]                                                                       Query Response Note      PATIENT:               VALORIE LOAIZA  ACCT #:                  [id number]  MRN:                     [id number]  :                      [id number]  ADMIT DATE:       2023 11:39 AM  DISCH DATE:          RESPONDING  PROVIDER #:        [id number]           QUERY TEXT:    Malnutrition is documented in the  geriatric med progress note.      Please specify the severity based on clinical indicators.      The patient's Clinical Indicators include:  Clinical Findings:  Per  RD evaluation:   - at risk for malnutrition with mild-moderate muscle wasting in temporal, clavicle and masseter region  - daughter reports visually being  able to see weight loss in patient    Per  geriatric med progress note: Significant muscles loss and cachectic appearance    BMI 21.75 kg/m².,classification: Normal      Risk Factors: Dementia, unmotivated to eat, depression, advanced age, lives alone    Treatments: RD consult, monitor weight, Ensure compact TID, Magic cups with dinner, document intake, nutrition rep to follow, recommended 1:1 supervision to encourage intake during meal times, comfort measures      Contact me with any questions.    Thank you for your time and attention,  Tammy Jones RN, CDI  [email or web address]  Connect via email, Voalte or messenger.  Options provided:   -- Mild protein calorie malnutrition   -- Moderate protein calorie malnutrition   -- Severe protein calorie malnutrition   -- Other explanation, (please specify other explanation)      Query created by: Tammy Jones on 2023 12:57 PM    RESPONSE TEXT:    Severe protein calorie malnutrition          Electronically signed by:  MISHA TAY MD 2023 1:02 PM

## 2023-12-02 NOTE — CARE PLAN
Problem: Pain - Standard  Goal: Alleviation of pain or a reduction in pain to the patient’s comfort goal  Description: Target End Date:  Prior to discharge or change in level of care    Document on Vitals flowsheet    1.  Document pain using the appropriate pain scale per order or unit policy  2.  Educate and implement non-pharmacologic comfort measures (i.e. relaxation, distraction, massage, cold/heat therapy, etc.)  3.  Pain management medications as ordered  4.  Reassess pain after pain med administration per policy  5.  If opiods administered assess patient's response to pain medication is appropriate per POSS sedation scale  6.  Follow pain management plan developed in collaboration with patient and interdisciplinary team (including palliative care or pain specialists if applicable)  Outcome: Progressing   The patient is Stable - Low risk of patient condition declining or worsening    Shift Goals  Clinical Goals: comfort  Patient Goals: yue  Family Goals: YUE    Progress made toward(s) clinical / shift goals:      Patient is not progressing towards the following goals:

## 2023-12-03 NOTE — DISCHARGE SUMMARY
Death Summary    Cause of Death  Advanced dementia, failure to thrive recurrent falls, and multiorgan failures.    Comorbid Conditions at the Time of Death  Principal Problem:    Trauma (POA: Yes)  Active Problems:    Dementia (HCC) (POA: Yes)    Acute kidney injury (HCC) (POA: Yes)    Encounter for current long term use of antiplatelet drug (POA: Yes)    Intracranial hemorrhage, subarachnoid (HCC) (POA: Yes)    Hypothyroid (POA: Yes)    Dyslipidemia (POA: Yes)    Contraindication to deep vein thrombosis (DVT) prophylaxis (POA: Yes)    Depression (POA: Yes)    Arrhythmia (POA: Yes)    Hiatal hernia (POA: Yes)    Compression fracture of T5 vertebra (HCC) (POA: Yes)    Multiple contusions (POA: Yes)    Advance care planning (POA: Yes)    Rectovaginal fistula (POA: Yes)    Discharge planning issues (POA: Yes)    Urinary retention (POA: Yes)    Colitis (POA: Unknown)    Comfort measures only status (POA: Clinically Undetermined)    ACP (advance care planning) (POA: Yes)  Resolved Problems:    * No resolved hospital problems. *      History of Presenting Illness and Hospital Course    89-year-old female with history of dementia, dyslipidemia, hypothyroidism, depression and arrhythmia, SVT presented 11/28 to the hospital after fall.  Patient lives by herself and Olivier in her house, patient was found on the floor with multiple bruises and covered in stool, patient has been oriented to herself only, not able to give a good history.  Patient was admitted to trauma service, CT scan for head showed subarachnoid hemorrhage and intracranial hemorrhage, neurosurgery was consulted and no intervention needed at this time.     Patient was found to have elevated white blood cell, CT scan for abdomen did not show any fistula however showed colitis, blood culture was negative and C. difficile was negative, initially antibiotics were started however later was discontinued for comfort care     Discussed the case with her power of  , daughter, discussed all labs and images, daughter agreed for DNR/DNI, the plan of care also was discussed again on 11/30 with the patient's daughter and son, they agreed to start with comfort care with no any aggressive treatment and avoid any antibiotics lab or images.  And patient was pronounced on 12/1/2023.       Death Date: 12/01/23   Death Time: 1800         Pronounced By (RN1): Tatiana Shaver  Pronounced By (RN2): Shasta Magill

## 2023-12-04 LAB
BACTERIA BLD CULT: NORMAL
BACTERIA BLD CULT: NORMAL
SIGNIFICANT IND 70042: NORMAL
SIGNIFICANT IND 70042: NORMAL
SITE SITE: NORMAL
SITE SITE: NORMAL
SOURCE SOURCE: NORMAL
SOURCE SOURCE: NORMAL